# Patient Record
Sex: MALE | Race: WHITE | Employment: UNEMPLOYED | URBAN - METROPOLITAN AREA
[De-identification: names, ages, dates, MRNs, and addresses within clinical notes are randomized per-mention and may not be internally consistent; named-entity substitution may affect disease eponyms.]

---

## 2017-01-12 ENCOUNTER — OFFICE VISIT (OUTPATIENT)
Dept: FAMILY MEDICINE CLINIC | Age: 32
End: 2017-01-12

## 2017-01-12 VITALS
WEIGHT: 165.6 LBS | TEMPERATURE: 97.6 F | SYSTOLIC BLOOD PRESSURE: 122 MMHG | RESPIRATION RATE: 18 BRPM | HEART RATE: 71 BPM | HEIGHT: 72 IN | DIASTOLIC BLOOD PRESSURE: 84 MMHG | OXYGEN SATURATION: 98 % | BODY MASS INDEX: 22.43 KG/M2

## 2017-01-12 DIAGNOSIS — R10.12 ABDOMINAL PAIN, LUQ (LEFT UPPER QUADRANT): ICD-10-CM

## 2017-01-12 DIAGNOSIS — S59.912A INJURY OF LEFT LOWER ARM, INITIAL ENCOUNTER: Primary | ICD-10-CM

## 2017-01-12 DIAGNOSIS — F10.10 ALCOHOL ABUSE: ICD-10-CM

## 2017-01-12 NOTE — MR AVS SNAPSHOT
Visit Information Date & Time Provider Department Dept. Phone Encounter #  
 1/12/2017  9:30 AM Gelacio Bowie  [facility] [address] [phone] [id number] Upcoming Health Maintenance Date Due Pneumococcal 19-64 Medium Risk (1 of 1 - PPSV23) 5/25/2020* DTaP/Tdap/Td series (2 - Td) 4/7/2021 *Topic was postponed. The date shown is not the original due date. Allergies as of 1/12/2017  Review Complete On: 1/12/2017 By: Gelacio Bowie NP Severity Noted Reaction Type Reactions Pcn [Penicillins]  02/29/2012    Hives Current Immunizations  Never Reviewed No immunizations on file. Not reviewed this visit You Were Diagnosed With   
  
 Codes Comments Injury of left lower arm, initial encounter    -  Primary ICD-10-CM: U27.304C ICD-9-CM: 889. 3 Abdominal pain, LUQ (left upper quadrant)     ICD-10-CM: R10.12 ICD-9-CM: 789.02 Alcohol abuse     ICD-10-CM: F10.10 ICD-9-CM: 305.00 Vitals BP Pulse Temp Resp Height(growth percentile) Weight(growth percentile) 122/84 (BP 1 Location: Right arm, BP Patient Position: Sitting) 71 97.6 °F (36.4 °C) (Oral) 18 6' (1.829 m) 165 lb 9.6 oz (75.1 kg) SpO2 BMI Smoking Status 98% 22.46 kg/m2 Former Smoker Vitals History BMI and BSA Data Body Mass Index Body Surface Area  
 22.46 kg/m 2 1.95 m 2 Preferred Pharmacy Pharmacy Name Phone [address], [address] [facility] [phone] Your Updated Medication List  
  
   
This list is accurate as of: 1/12/17 10:21 AM.  Always use your most recent med list.  
  
  
  
  
 Dexlansoprazole 60 mg Cpdb Commonly known as:  DEXILANT Take 1 Cap by mouth daily. We Performed the Following AMYLASE [id number] CPT(R)] CBC W/O DIFF [67104 CPT(R)] HEPATIC FUNCTION PANEL [14195 CPT(R)] LIPASE [id number] CPT(R)] METABOLIC PANEL, BASIC [87070 CPT(R)] ID COLLECTION VENOUS BLOOD,VENIPUNCTURE [id number] CPT(R)] ID HANDLG&/OR CONVEY OF SPEC FOR TR OFFICE TO LAB [30035 CPT(R)] To-Do List   
 01/12/2017 Imaging:  XR FOREARM LT AP/LAT Introducing [facility]! [facility] introduces [employer] patient portal. Now you can access parts of your medical record, email your doctor's office, and request medication refills online. 1. In your internet browser, go to [email or web address] 2. Click on the First Time User? Click Here link in the Sign In box. You will see the New Member Sign Up page. 3. Enter your [employer] Access Code exactly as it appears below. You will not need to use this code after youve completed the sign-up process. If you do not sign up before the expiration date, you must request a new code. · [employer] Access Code: [email or web address] Expires: 1/15/2017  1:29 PM 
 
4. Enter the last four digits of your Social Security Number (xxxx) and Date of Birth (mm/dd/yyyy) as indicated and click Submit. You will be taken to the next sign-up page. 5. Create a [employer] ID. This will be your [employer] login ID and cannot be changed, so think of one that is secure and easy to remember. 6. Create a [employer] password. You can change your password at any time. 7. Enter your Password Reset Question and Answer. This can be used at a later time if you forget your password. 8. Enter your e-mail address. You will receive e-mail notification when new information is available in [address] 9. Click Sign Up. You can now view and download portions of your medical record. 10. Click the Download Summary menu link to download a portable copy of your medical information. If you have questions, please visit the Frequently Asked Questions section of the [employer] website. Remember, [employer] is NOT to be used for urgent needs. For medical emergencies, dial 911. Now available from your iPhone and Android! Please provide this summary of care documentation to your next provider. Your primary care clinician is listed as Mark Mccord. If you have any questions after today's visit, please call [phone].

## 2017-01-12 NOTE — PROGRESS NOTES
HISTORY OF PRESENT ILLNESS  Benjy Prather is a 28 y.o. male. HPI  C/o pain in left arm x 1 week. States he got arm caught in branches while using . Was swollen initially, now improved. Still painful with use. Using ETOH for medication. History of ETOH abuse. Also c/o abdominal and epigastric discomfort. Has chronic gastritis on dexilant secondary to ETOH use. Reports discomfort has progressed after binging last pm on liquor. No vomiting. No blood in stool or melena. Past Medical History   Diagnosis Date    Dystonia     Gastric ulcer      Patient Active Problem List   Diagnosis Code    Gastritis K29.70    Alcohol abuse F10.10     Current Outpatient Prescriptions   Medication Sig    Dexlansoprazole (DEXILANT) 60 mg CpDB Take 1 Cap by mouth daily. No current facility-administered medications for this visit. Social History   Substance Use Topics    Smoking status: Former Smoker    Smokeless tobacco: Current User    Alcohol use 0.0 oz/week     18 - 30 Cans of beer per week      Comment: 18-24 beers in a day. Visit Vitals    /84 (BP 1 Location: Right arm, BP Patient Position: Sitting)    Pulse 71    Temp 97.6 °F (36.4 °C) (Oral)    Resp 18    Ht 6' (1.829 m)    Wt 165 lb 9.6 oz (75.1 kg)    SpO2 98%    BMI 22.46 kg/m2     Review of Systems   Constitutional: Negative for chills, fever and malaise/fatigue. Respiratory: Negative for cough and shortness of breath. Cardiovascular: Negative for chest pain, palpitations and leg swelling. Gastrointestinal: Positive for abdominal pain, heartburn and nausea. Negative for blood in stool, constipation, diarrhea, melena and vomiting. Musculoskeletal:        Left lower arm pain. Neurological: Negative for tingling, sensory change and headaches. Psychiatric/Behavioral: Positive for substance abuse (alcohol). Negative for hallucinations and suicidal ideas. All other systems reviewed and are negative.       Physical Exam   Constitutional: No distress. Neck: Neck supple. Cardiovascular: Normal rate, regular rhythm and normal heart sounds. Pulmonary/Chest: Effort normal and breath sounds normal.   Abdominal: Soft. He exhibits no distension and no mass. There is tenderness (LUQ, lower abdomen). There is no guarding. Musculoskeletal:        Left wrist: Normal.        Left forearm: He exhibits tenderness (distal radius), bony tenderness, swelling (trace) and laceration (1' well healed laceration). He exhibits no deformity. Lymphadenopathy:     He has no cervical adenopathy. Neurological: He is alert. Skin: Skin is warm and dry. Psychiatric:   Flat affect. ASSESSMENT and PLAN  Daquan Nguyen was seen today for arm injury. Diagnoses and all orders for this visit:    Injury of left lower arm, initial encounter  -     XR FOREARM LT AP/LAT; Future  No fracture per radiology report. Recommend ice, elevation. Avoid strenuous use until sx improve. ES tylenol prn pain. Strongly advised not to medicate with alcohol. Abdominal pain, LUQ (left upper quadrant)  -     DC HANDLG&/OR CONVEY OF SPEC FOR TR OFFICE TO LAB  -     DC COLLECTION VENOUS BLOOD,VENIPUNCTURE  -     CBC W/O DIFF  -     METABOLIC PANEL, BASIC  -     HEPATIC FUNCTION PANEL  -     LIPASE  -     AMYLASE  Possible flare of gastritis or ulcer disease secondary to  ETOH abuse. Will check labs today. Continue dexilant daily. Add OTC zantac 150 mg at hs. To ED prn if sx progress. Alcohol abuse  Discussed alcohol cessation and complications of continued excessive alcohol intake, including early mortality. Strongly advised in patient treatment. States he has done that 2 x prior with no lasting results. Declined any treatment at this time. Follow up prn.

## 2017-03-20 ENCOUNTER — OFFICE VISIT (OUTPATIENT)
Dept: FAMILY MEDICINE CLINIC | Age: 32
End: 2017-03-20

## 2017-03-20 VITALS
SYSTOLIC BLOOD PRESSURE: 130 MMHG | DIASTOLIC BLOOD PRESSURE: 80 MMHG | WEIGHT: 162 LBS | HEIGHT: 72 IN | TEMPERATURE: 97.6 F | BODY MASS INDEX: 21.94 KG/M2 | RESPIRATION RATE: 18 BRPM | OXYGEN SATURATION: 97 % | HEART RATE: 76 BPM

## 2017-03-20 DIAGNOSIS — K29.20 CHRONIC ALCOHOLIC GASTRITIS WITHOUT HEMORRHAGE: Primary | ICD-10-CM

## 2017-03-20 RX ORDER — DEXLANSOPRAZOLE 60 MG/1
60 CAPSULE, DELAYED RELEASE ORAL DAILY
Qty: 30 CAP | Refills: 5 | Status: SHIPPED | OUTPATIENT
Start: 2017-03-20 | End: 2017-11-16 | Stop reason: SDUPTHER

## 2017-03-20 NOTE — PROGRESS NOTES
Chief Complaint   Patient presents with    Medication Refill     needs refill of Dexilant       Reviewed Record in preparation for visit and have obtained necessary documentation. Identified pt with two pt identifiers (Name @ )    There are no preventive care reminders to display for this patient. 1. Have you been to the ER, urgent care clinic since your last visit? Hospitalized since your last visit? Went to Urgent care 2 wks ago , was dehydrated , flu test was negative. 2. Have you seen or consulted any other health care providers outside of the [address] [place] [address] since your last visit? Include any pap smears or colon screening.  No

## 2017-03-20 NOTE — PROGRESS NOTES
HISTORY OF PRESENT ILLNESS  Juliette Ward is a 28 y.o. male. HPI  Follow up chronic gastritis secondary to alcohol use on daily dexilant. Was having some LUQ discomfort at last visit but reports symptoms have resolved. 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 Patient Active Problem List   Diagnosis Code    Gastritis K29.70    Alcohol use disorder ([address] F10.99     Current Outpatient Prescriptions   Medication Sig    Dexlansoprazole (DEXILANT) 60 mg CpDB Take 1 Cap by mouth daily. No current facility-administered medications for this visit. Social History   Substance Use Topics    Smoking status: Current Some Day Smoker    Smokeless tobacco: Current User      Comment: and chews tobacc0    Alcohol use 0.0 oz/week     18 - 30 Cans of beer per week      Comment: 18-24 beers in a day. Visit Vitals    /80 (BP 1 Location: Right arm, BP Patient Position: Sitting)    Pulse 76    Temp 97.6 °F (36.4 °C) (Oral)    Resp 18    Ht 6' (1.829 m)    Wt 162 lb (73.5 kg)    SpO2 97%    BMI 21.97 kg/m2       Review of Systems   Constitutional: Negative. Respiratory: Negative for cough and shortness of breath. Cardiovascular: Negative for chest pain and palpitations. Gastrointestinal: Negative for abdominal pain, blood in stool, melena, nausea and vomiting. Psychiatric/Behavioral: Positive for substance abuse. All other systems reviewed and are negative. Physical Exam   Constitutional: No distress. Thin. Neck: Neck supple. Cardiovascular: Normal rate, regular rhythm and normal heart sounds. Pulmonary/Chest: Effort normal and breath sounds normal.   Abdominal: Soft. He exhibits no distension. There is no tenderness. There is no guarding. Musculoskeletal: He exhibits no edema. Lymphadenopathy:     He has no cervical adenopathy. Skin: Skin is warm and dry.    Psychiatric: He has a normal mood and affect. His behavior is normal. Thought content normal.       ASSESSMENT and PLAN  [facility] was seen today for medication refill. Diagnoses and all orders for this visit:    Chronic alcoholic gastritis without hemorrhage  -     Dexlansoprazole (DEXILANT) 60 mg CpDB; Take 1 Cap by mouth daily. Symptoms stable on dexilant. Continue current treatment. Alcohol use disorder ([facility]    Potential complications of alcohol abuse again reviewed with patient. He is not ready to stop at this time. Follow up 6 months or sooner prn.

## 2017-03-20 NOTE — MR AVS SNAPSHOT
Visit Information Date & Time Provider Department Dept. Phone Encounter #  
 3/20/2017  3:00 PM Raulmor Charleejeane, [address] [phone] [id number] Upcoming Health Maintenance Date Due Pneumococcal 19-64 Medium Risk (1 of 1 - PPSV23) 5/25/2020* DTaP/Tdap/Td series (2 - Td) 4/7/2021 *Topic was postponed. The date shown is not the original due date. Allergies as of 3/20/2017  Review Complete On: 3/20/2017 By: Candida Akhtar NP Severity Noted Reaction Type Reactions Pcn [Penicillins]  02/29/2012    Hives Current Immunizations  Never Reviewed No immunizations on file. Not reviewed this visit You Were Diagnosed With   
  
 Codes Comments Chronic alcoholic gastritis without hemorrhage    -  Primary ICD-10-CM: K29.20 ICD-9-CM: 535.30 [facility] disorder ([facility]     ICD-10-CM: F10.99 ICD-9-CM: 305.00 Vitals BP Pulse Temp Resp Height(growth percentile) Weight(growth percentile) 130/80 (BP 1 Location: Right arm, BP Patient Position: Sitting) 76 97.6 °F (36.4 °C) (Oral) 18 6' (1.829 m) 162 lb (73.5 kg) SpO2 BMI Smoking Status 97% 21.97 kg/m2 Current Some Day Smoker BMI and BSA Data Body Mass Index Body Surface Area  
 21.97 kg/m 2 1.93 m 2 Preferred Pharmacy Pharmacy Name Phone [address], [address] [facility] [phone] Your Updated Medication List  
  
   
This list is accurate as of: 3/20/17  3:21 PM.  Always use your most recent med list.  
  
  
  
  
 Dexlansoprazole 60 mg Cpdb Commonly known as:  DEXILANT Take 1 Cap by mouth daily. Prescriptions Sent to Pharmacy Refills Dexlansoprazole (DEXILANT) 60 mg CpDB 5 Sig: Take 1 Cap by mouth daily. Class: Normal  
 Pharmacy: [address], [address] [facility] #: [phone] Route: Oral  
  
Introducing [facility]! Sarah Enriquez introduces [employer] patient portal. Now you can access parts of your medical record, email your doctor's office, and request medication refills online. 1. In your internet browser, go to [email or web address] 2. Click on the First Time User? Click Here link in the Sign In box. You will see the New Member Sign Up page. 3. Enter your [employer] Access Code exactly as it appears below. You will not need to use this code after youve completed the sign-up process. If you do not sign up before the expiration date, you must request a new code. · [employer] Access Code: [email or web address] Expires: 6/18/2017  3:21 PM 
 
4. Enter the last four digits of your Social Security Number (xxxx) and Date of Birth (mm/dd/yyyy) as indicated and click Submit. You will be taken to the next sign-up page. 5. Create a [employer] ID. This will be your [employer] login ID and cannot be changed, so think of one that is secure and easy to remember. 6. Create a [employer] password. You can change your password at any time. 7. Enter your Password Reset Question and Answer. This can be used at a later time if you forget your password. 8. Enter your e-mail address. You will receive e-mail notification when new information is available in [address] 9. Click Sign Up. You can now view and download portions of your medical record. 10. Click the Download Summary menu link to download a portable copy of your medical information. If you have questions, please visit the Frequently Asked Questions section of the [employer] website. Remember, [employer] is NOT to be used for urgent needs. For medical emergencies, dial 911. Now available from your iPhone and Android! Please provide this summary of care documentation to your next provider. Your primary care clinician is listed as Allegra Manzo. If you have any questions after today's visit, please call [phone].

## 2017-07-17 ENCOUNTER — OFFICE VISIT (OUTPATIENT)
Dept: FAMILY MEDICINE CLINIC | Age: 32
End: 2017-07-17

## 2017-07-17 VITALS
WEIGHT: 161.8 LBS | SYSTOLIC BLOOD PRESSURE: 131 MMHG | OXYGEN SATURATION: 98 % | BODY MASS INDEX: 21.44 KG/M2 | DIASTOLIC BLOOD PRESSURE: 85 MMHG | HEIGHT: 73 IN | TEMPERATURE: 98 F | HEART RATE: 70 BPM | RESPIRATION RATE: 18 BRPM

## 2017-07-17 DIAGNOSIS — S00.03XA CONTUSION OF SCALP, INITIAL ENCOUNTER: Primary | ICD-10-CM

## 2017-07-17 NOTE — PROGRESS NOTES
HISTORY OF PRESENT ILLNESS  Ab Crespo is a 28 y.o. male. HPI  C/o injury to head. Reports he was drunk and fell, striking his [place] 1 week ago. States witnesses say he did not lose consciousness but he was too drunk to remember. Still having soreness at area of injury. No headache or focal neuro changes. Denies problems with memory or speech. Patient is an alcoholic and continues to drink large quantities of alcohol. He is not ready for rehab at this time. Patient Active Problem List   Diagnosis Code    Gastritis K29.70    Alcohol use disorder ([facility] F10.99     Current Outpatient Prescriptions   Medication Sig    Dexlansoprazole (DEXILANT) 60 mg CpDB Take 1 Cap by mouth daily. No current facility-administered medications for this visit. Social History   Substance Use Topics    Smoking status: Current Some Day Smoker    Smokeless tobacco: Current User      Comment: and chews tobacc0    Alcohol use 0.0 oz/week     18 - 30 Cans of beer per week      Comment: 18-24 beers in a day. Visit Vitals    /85    Pulse 70    Temp 98 °F (36.7 °C) (Oral)    Resp 18    Ht 6' 1\" (1.854 m)    Wt 161 lb 12.8 oz (73.4 kg)    SpO2 98%    BMI 21.35 kg/m2     Review of Systems   Constitutional: Negative for chills, fever and malaise/fatigue. Eyes: Negative. Respiratory: Negative for shortness of breath. Cardiovascular: Negative for chest pain and palpitations. Neurological: Negative for dizziness, tingling, sensory change, speech change, focal weakness, seizures, loss of consciousness and headaches. Psychiatric/Behavioral: Negative for memory loss. Physical Exam   Constitutional: He is oriented to person, place, and time. No distress. HENT:   Head: Head is with contusion (right skull above temple). Head is without abrasion and without laceration. Small amount of local edema at area of contusion.    Eyes: EOM are normal. Pupils are equal, round, and reactive to light. Neck: Neck supple. Cardiovascular: Normal rate, regular rhythm and normal heart sounds. Pulmonary/Chest: Effort normal and breath sounds normal.   Musculoskeletal: He exhibits no edema. Lymphadenopathy:     He has no cervical adenopathy. Neurological: He is alert and oriented to person, place, and time. He has normal strength. No sensory deficit. Coordination and gait normal.   Skin: Skin is warm and dry. Psychiatric: He has a normal mood and affect. ASSESSMENT and PLAN  Prince Ricci was seen today for head injury. Diagnoses and all orders for this visit:    Contusion of scalp, initial encounter  -     XR SKULL MIN 4 V(COMP); Future  Normal neuro exam.  No signs of concussion but difficult to assess due to alcohol abuse. Xray negative for skull fracture. Recommend ice to affected area tid. Alcohol use disorder ([facility]    Strongly advised rehab. Patient continues to decline treatment at this time. Follow up prn.

## 2017-07-17 NOTE — PROGRESS NOTES
Identified pt with two pt identifiers(name and ). Reviewed record in preparation for visit and have obtained necessary documentation. Chief Complaint   Patient presents with    Head Injury     pt c/o hit head last week while drinking; unclear of LOC, denies visual changes        There are no preventive care reminders to display for this patient. Coordination of Care Questionnaire:  :   1) Have you been to an emergency room, urgent care clinic since your last visit? no   Hospitalized since your last visit? no             2. Have seen or consulted any other health care provider since your last visit? NO  If yes, where when, and reason for visit? 3) Do you have an Advanced Directive/ Living Will in place? NO  If yes, do we have a copy on file NO  If no, would you like information NO    Patient is accompanied by self I have received verbal consent from Kim Santos to discuss any/all medical information while they are present in the room.

## 2017-07-27 ENCOUNTER — TELEPHONE (OUTPATIENT)
Dept: FAMILY MEDICINE CLINIC | Age: 32
End: 2017-07-27

## 2017-07-27 NOTE — TELEPHONE ENCOUNTER
----- Message from Rupert Sarabia sent at 7/26/2017  5:36 PM EDT -----  Regarding: ANP [place]on / telephone  Pt is requesting To have the the Nurse call him back and best contact number is [phone].

## 2017-08-21 ENCOUNTER — APPOINTMENT (OUTPATIENT)
Dept: GENERAL RADIOLOGY | Age: 32
End: 2017-08-21
Attending: EMERGENCY MEDICINE
Payer: COMMERCIAL

## 2017-08-21 ENCOUNTER — HOSPITAL ENCOUNTER (EMERGENCY)
Age: 32
Discharge: HOME | End: 2017-08-22
Attending: EMERGENCY MEDICINE | Admitting: EMERGENCY MEDICINE
Payer: COMMERCIAL

## 2017-08-21 DIAGNOSIS — S22.31XA CLOSED FRACTURE OF RIB OF RIGHT SIDE, INITIAL ENCOUNTER: Primary | ICD-10-CM

## 2017-08-21 PROCEDURE — 99284 EMERGENCY DEPT VISIT MOD MDM: CPT

## 2017-08-21 PROCEDURE — 71101 X-RAY EXAM UNILAT RIBS/CHEST: CPT

## 2017-08-21 PROCEDURE — 94762 N-INVAS EAR/PLS OXIMTRY CONT: CPT

## 2017-08-21 PROCEDURE — 96361 HYDRATE IV INFUSION ADD-ON: CPT

## 2017-08-21 PROCEDURE — 96360 HYDRATION IV INFUSION INIT: CPT

## 2017-08-22 ENCOUNTER — APPOINTMENT (OUTPATIENT)
Dept: CT IMAGING | Age: 32
End: 2017-08-22
Attending: EMERGENCY MEDICINE
Payer: COMMERCIAL

## 2017-08-22 VITALS
DIASTOLIC BLOOD PRESSURE: 85 MMHG | BODY MASS INDEX: 21.15 KG/M2 | RESPIRATION RATE: 18 BRPM | TEMPERATURE: 97.9 F | HEART RATE: 93 BPM | OXYGEN SATURATION: 94 % | SYSTOLIC BLOOD PRESSURE: 136 MMHG | HEIGHT: 73 IN | WEIGHT: 159.6 LBS

## 2017-08-22 LAB
ALBUMIN SERPL-MCNC: 4.4 G/DL (ref 3.5–5)
ALBUMIN/GLOB SERPL: 1.2 {RATIO} (ref 1.1–2.2)
ALP SERPL-CCNC: 118 U/L (ref 45–117)
ALT SERPL-CCNC: 57 U/L (ref 12–78)
AMPHET UR QL SCN: NEGATIVE
AMYLASE SERPL-CCNC: 53 U/L (ref 25–115)
ANION GAP BLD CALC-SCNC: 19 MMOL/L (ref 5–15)
APPEARANCE UR: CLEAR
AST SERPL-CCNC: 126 U/L (ref 15–37)
BACTERIA URNS QL MICRO: NEGATIVE /HPF
BARBITURATES UR QL SCN: NEGATIVE
BENZODIAZ UR QL: NEGATIVE
BILIRUB DIRECT SERPL-MCNC: 0.3 MG/DL (ref 0–0.2)
BILIRUB SERPL-MCNC: 1.4 MG/DL (ref 0.2–1)
BILIRUB UR QL: NEGATIVE
BUN BLD-MCNC: 5 MG/DL (ref 9–20)
CA-I BLD-MCNC: 1.06 MMOL/L (ref 1.12–1.32)
CANNABINOIDS UR QL SCN: NEGATIVE
CHLORIDE BLD-SCNC: 105 MMOL/L (ref 98–107)
CO2 BLD-SCNC: 26 MMOL/L (ref 21–32)
COCAINE UR QL SCN: NEGATIVE
COLOR UR: NORMAL
CREAT BLD-MCNC: 1.1 MG/DL (ref 0.6–1.3)
DRUG SCRN COMMENT,DRGCM: NORMAL
EPITH CASTS URNS QL MICRO: NORMAL /LPF
ETHANOL SERPL-MCNC: 363 MG/DL
GLOBULIN SER CALC-MCNC: 3.8 G/DL (ref 2–4)
GLUCOSE BLD-MCNC: 100 MG/DL (ref 65–100)
GLUCOSE UR STRIP.AUTO-MCNC: NEGATIVE MG/DL
HCT VFR BLD CALC: 51 % (ref 36.6–50.3)
HGB BLD-MCNC: 17.3 GM/DL (ref 12.1–17)
HGB UR QL STRIP: NEGATIVE
HYALINE CASTS URNS QL MICRO: NORMAL /LPF (ref 0–5)
KETONES UR QL STRIP.AUTO: NEGATIVE MG/DL
LEUKOCYTE ESTERASE UR QL STRIP.AUTO: NEGATIVE
LIPASE SERPL-CCNC: 197 U/L (ref 73–393)
METHADONE UR QL: NEGATIVE
NITRITE UR QL STRIP.AUTO: NEGATIVE
OPIATES UR QL: NEGATIVE
PCP UR QL: NEGATIVE
PH UR STRIP: 5.5 [PH] (ref 5–8)
POTASSIUM BLD-SCNC: 3.9 MMOL/L (ref 3.5–5.1)
PROT SERPL-MCNC: 8.2 G/DL (ref 6.4–8.2)
PROT UR STRIP-MCNC: NEGATIVE MG/DL
RBC #/AREA URNS HPF: NORMAL /HPF (ref 0–5)
SERVICE CMNT-IMP: ABNORMAL
SODIUM BLD-SCNC: 144 MMOL/L (ref 136–145)
SP GR UR REFRACTOMETRY: <1.005 (ref 1–1.03)
UA: UC IF INDICATED,UAUC: NORMAL
UROBILINOGEN UR QL STRIP.AUTO: 0.2 EU/DL (ref 0.2–1)
WBC URNS QL MICRO: NORMAL /HPF (ref 0–4)

## 2017-08-22 PROCEDURE — 83690 ASSAY OF LIPASE: CPT | Performed by: EMERGENCY MEDICINE

## 2017-08-22 PROCEDURE — 80307 DRUG TEST PRSMV CHEM ANLYZR: CPT | Performed by: EMERGENCY MEDICINE

## 2017-08-22 PROCEDURE — 74011250636 HC RX REV CODE- 250/636: Performed by: EMERGENCY MEDICINE

## 2017-08-22 PROCEDURE — 74011000258 HC RX REV CODE- 258: Performed by: EMERGENCY MEDICINE

## 2017-08-22 PROCEDURE — 80047 BASIC METABLC PNL IONIZED CA: CPT

## 2017-08-22 PROCEDURE — 71260 CT THORAX DX C+: CPT

## 2017-08-22 PROCEDURE — 74177 CT ABD & PELVIS W/CONTRAST: CPT

## 2017-08-22 PROCEDURE — 82150 ASSAY OF AMYLASE: CPT | Performed by: EMERGENCY MEDICINE

## 2017-08-22 PROCEDURE — 80076 HEPATIC FUNCTION PANEL: CPT | Performed by: EMERGENCY MEDICINE

## 2017-08-22 PROCEDURE — 36415 COLL VENOUS BLD VENIPUNCTURE: CPT | Performed by: EMERGENCY MEDICINE

## 2017-08-22 PROCEDURE — 81001 URINALYSIS AUTO W/SCOPE: CPT | Performed by: EMERGENCY MEDICINE

## 2017-08-22 PROCEDURE — 74011636320 HC RX REV CODE- 636/320: Performed by: EMERGENCY MEDICINE

## 2017-08-22 RX ORDER — SODIUM CHLORIDE 0.9 % (FLUSH) 0.9 %
10 SYRINGE (ML) INJECTION
Status: COMPLETED | OUTPATIENT
Start: 2017-08-22 | End: 2017-08-22

## 2017-08-22 RX ORDER — NAPROXEN 500 MG/1
500 TABLET ORAL 2 TIMES DAILY WITH MEALS
Qty: 30 TAB | Refills: 0 | Status: SHIPPED | OUTPATIENT
Start: 2017-08-22 | End: 2017-09-01

## 2017-08-22 RX ORDER — HYDROCODONE BITARTRATE AND ACETAMINOPHEN 5; 325 MG/1; MG/1
1-2 TABLET ORAL
Qty: 20 TAB | Refills: 0 | Status: SHIPPED | OUTPATIENT
Start: 2017-08-22 | End: 2017-09-13

## 2017-08-22 RX ADMIN — Medication 10 ML: at 00:43

## 2017-08-22 RX ADMIN — SODIUM CHLORIDE 1000 ML: 900 INJECTION, SOLUTION INTRAVENOUS at 00:20

## 2017-08-22 RX ADMIN — SODIUM CHLORIDE 100 ML: 900 INJECTION, SOLUTION INTRAVENOUS at 00:43

## 2017-08-22 RX ADMIN — IOPAMIDOL 100 ML: 755 INJECTION, SOLUTION INTRAVENOUS at 00:43

## 2017-08-22 NOTE — ED TRIAGE NOTES
TRIAGE NOTE: Patient arrived from home with c/o RIGHT sided rib pain. Patient states, \"i was walking in socks on the hardwood floor and then I slipped and hit the couch. I feel like I broke my ribs, cause I have done it before. \" \"It hurts to take a deep breath. \"

## 2017-08-22 NOTE — ED PROVIDER NOTES
HPI Comments: 28 y.o. male with past medical history significant for dystonia, gastric ulcer who presents from home accompanied by friend with chief complaint of back pain. Pt reports s/p an evening of drinking he sustained a GLF this morning at 0100. Pt states that he fell to his right side after getting out of bed. Pt c/o right mid back/right flank pain of which is exacerbated with movement and deep inhalation. Pt states that he went back to sleep after the fall and also attended work today but that the pain has been constant. Pt reports that he does consume alcohol daily but denies a need for resources. Pt further denies abdominal pain, nausea, vomiting, diarrhea, constipation, numbness, weakness, headache, LOC, blurred vision or neck pain. There are no other acute medical concerns at this time. Social hx: + EtOH abuse. Lives with mother. PCP: Shelley Kay NP    Note written by Oracio Rocha, as dictated by Ray Ortega MD 12:05 AM    The history is provided by the patient. No  was used. Past Medical History:   Diagnosis Date    Dystonia     Gastric ulcer        Past Surgical History:   Procedure Laterality Date    HX ORTHOPAEDIC      rib fracture- LEFT and RIGHT         Family History:   Problem Relation Age of Onset    Diabetes Mother     Alcohol abuse Mother     Diabetes Maternal Grandfather     Hypertension Father     Alcohol abuse Father     Alcohol abuse Brother        Social History     Social History    Marital status: SINGLE     Spouse name: N/A    Number of children: N/A    Years of education: N/A     Occupational History    Not on file. Social History Main Topics    Smoking status: Current Some Day Smoker    Smokeless tobacco: Current User      Comment: and chews tobacc0    Alcohol use 0.0 oz/week     18 - 30 Cans of beer per week      Comment: 18-24 beers in a day.      Drug use: No    Sexual activity: Yes     Partners: Female Other Topics Concern    Not on file     Social History Narrative         ALLERGIES: Pcn [penicillins]    Review of Systems   Constitutional: Negative for fever. HENT: Negative for congestion and sore throat. Eyes: Negative for photophobia. Respiratory: Negative for cough and shortness of breath. Cardiovascular: Negative for chest pain and leg swelling. Gastrointestinal: Negative for abdominal pain, constipation, diarrhea, nausea and vomiting. Genitourinary: Negative for difficulty urinating, dysuria and hematuria. Musculoskeletal: Positive for back pain (right, mid). Negative for neck pain. Skin: Negative for rash. Neurological: Negative for dizziness, weakness, numbness and headaches. All other systems reviewed and are negative. Vitals:    08/21/17 2341   BP: (!) 147/95   Pulse: (!) 106   Resp: 16   Temp: 97.6 °F (36.4 °C)   SpO2: 95%   Weight: 72.4 kg (159 lb 9.6 oz)   Height: 6' 1\" (1.854 m)            Physical Exam   Nursing note and vitals reviewed. CONSTITUTIONAL: Well-appearing; well-nourished; in mild distress  HEAD: Normocephalic; atraumatic  EYES: PERRL; EOM intact; conjunctiva and sclera are clear bilaterally. ENT: No rhinorrhea; normal pharynx with no tonsillar hypertrophy; mucous membranes pink/moist, no erythema, no exudate. NECK: Supple; non-tender; no cervical lymphadenopathy  CARD: Normal S1, S2; no murmurs, rubs, or gallops. Regular rate and rhythm. RESP: Normal respiratory effort; breath sounds clear and equal bilaterally; no wheezes, rhonchi, or rales. Right sided chest wall tenderness on palpations and movements of torso and both arms. ABD: Normal bowel sounds; non-distended; non-tender; no palpable organomegaly, no masses, no bruits. Back Exam: Normal inspection; no vertebral point tenderness, Right CVA tenderness. Normal range of motion.   EXT: Normal ROM in all four extremities; non-tender to palpation; no swelling or deformity; distal pulses are normal, no edema. SKIN: Warm; dry; no rash. NEURO:Alert and oriented x 3, coherent, FILIBERTO-XII grossly intact, sensory and motor are non-focal.        MDM  Number of Diagnoses or Management Options  Alcoholism /alcohol abuse [facility]):   Closed fracture of rib of right side, initial encounter:   Diagnosis management comments: Assessment: 27-year-old male with following diagnosis alcohol abuse with dependency and chronic alcoholism/ right flank pain, status post fall rule out ribs fracture, abdominal, and Visceral thorac abdominal pathology. The patient denies any suicidal / homicidal ideation      Plan: EKG/ lab/ IV fluid/ chest x-ray with right-sided weakne/ CT chest, abdomen, and pelvis/ Monitor and Reevaluate. Amount and/or Complexity of Data Reviewed  Clinical lab tests: ordered and reviewed  Tests in the radiology section of CPT®: ordered and reviewed  Tests in the medicine section of CPT®: ordered and reviewed  Discussion of test results with the performing providers: yes  Decide to obtain previous medical records or to obtain history from someone other than the patient: yes  Obtain history from someone other than the patient: yes  Review and summarize past medical records: yes  Discuss the patient with other providers: yes  Independent visualization of images, tracings, or specimens: yes    Risk of Complications, Morbidity, and/or Mortality  Presenting problems: moderate  Diagnostic procedures: moderate  Management options: moderate      ED Course       Procedures     XRAY INTERPRETATION (ED MD)  Chest Xray and Right sided Rib series:  Acute nondisplaced lateral right seventh rib fracture. Clear lungs. No infiltrate. Albertine Gitelman, MD 12:28 AM    Progress Note:   Pt has been reexamined by Albertine Gitelman, MD. Pt is feeling much better. Symptoms have improved. All available results have been reviewed with pt and any available family. Pt understands sx, dx, and tx in ED.  Care plan has been outlined and questions have been answered. Pt is ready to go home. Will send home on alcoholism/ fracture instructions. Prescription of naproxen and norc. Outpatient referral with PCP as needed. Written by Steph Colbert MD,4:47 AM    .   .

## 2017-08-22 NOTE — DISCHARGE INSTRUCTIONS
We hope that we have addressed all of your medical concerns. The examination and treatment you received in the Emergency Department were for an emergent problem and were not intended as complete care. It is important that you follow up with your healthcare provider(s) for ongoing care. If your symptoms worsen or do not improve as expected, and you are unable to reach your usual health care provider(s), you should return to the Emergency Department. Today's healthcare is undergoing tremendous change, and patient satisfaction surveys are one of the many tools to assess the quality of medical care. You may receive a survey from the [employer] regarding your experience in the Emergency Department. I hope that your experience has been completely positive, particularly the medical care that I provided. As such, please participate in the survey; anything less than excellent does not meet my expectations or intentions. [facility] and [address] [place] [address] participate in nationally recognized quality of care measures. If your blood pressure is greater than 120/80, as reported below, we urge that you seek medical care to address the potential of high blood pressure, commonly known as hypertension. Hypertension can be hereditary or can be caused by certain medical conditions, pain, stress, or \"white coat syndrome. \"       Please make an appointment with your health care provider(s) for follow up of your Emergency Department visit. VITALS:   Patient Vitals for the past 8 hrs:   Temp Pulse Resp BP SpO2   08/21/17 2341 97.6 °F (36.4 °C) (!) 106 16 (!) 147/95 95 %          Thank you for allowing us to provide you with medical care today. We realize that you have many choices for your emergency care needs. Please choose us in the future for any continued health care needs. Feliciano Arauz MD    [facility]. Office: [phone]            Recent Results (from the past 24 hour(s))   POC CHEM8    Collection Time: 08/22/17 12:19 AM   Result Value Ref Range    Calcium, ionized (POC) 1.06 (L) 1.12 - 1.32 MMOL/L    Sodium (POC) 144 136 - 145 MMOL/L    Potassium (POC) 3.9 3.5 - 5.1 MMOL/L    Chloride (POC) 105 98 - 107 MMOL/L    CO2 (POC) 26 21 - 32 MMOL/L    Anion gap (POC) 19 (H) 5 - 15 mmol/L    Glucose (POC) 100 65 - 100 MG/DL    BUN (POC) 5 (L) 9 - 20 MG/DL    Creatinine (POC) 1.1 0.6 - 1.3 MG/DL    GFRAA, POC >60 >60 ml/min/1.73m2    GFRNA, POC >60 >60 ml/min/1.73m2    Hemoglobin (POC) 17.3 (H) 12.1 - 17.0 GM/DL    Hematocrit (POC) 51 (H) 36.6 - 50.3 %    Comment Comment Not Indicated.      ETHYL ALCOHOL    Collection Time: 08/22/17 12:21 AM   Result Value Ref Range    ALCOHOL(ETHYL),SERUM 363 (H) <10 MG/DL   AMYLASE    Collection Time: 08/22/17 12:21 AM   Result Value Ref Range    Amylase 53 25 - 115 U/L   LIPASE    Collection Time: 08/22/17 12:21 AM   Result Value Ref Range    Lipase 197 73 - 393 U/L   URINALYSIS W/ REFLEX CULTURE    Collection Time: 08/22/17 12:21 AM   Result Value Ref Range    Color YELLOW/STRAW      Appearance CLEAR CLEAR      Specific gravity <1.005 1.003 - 1.030    pH (UA) 5.5 5.0 - 8.0      Protein NEGATIVE  NEG mg/dL    Glucose NEGATIVE  NEG mg/dL    Ketone NEGATIVE  NEG mg/dL    Bilirubin NEGATIVE  NEG      Blood NEGATIVE  NEG      Urobilinogen 0.2 0.2 - 1.0 EU/dL    Nitrites NEGATIVE  NEG      Leukocyte Esterase NEGATIVE  NEG      WBC 0-4 0 - 4 /hpf    RBC 0-5 0 - 5 /hpf    Epithelial cells FEW FEW /lpf    Bacteria NEGATIVE  NEG /hpf    UA:UC IF INDICATED CULTURE NOT INDICATED BY UA RESULT CNI      Hyaline cast 0-2 0 - 5 /lpf   DRUG SCREEN, URINE    Collection Time: 08/22/17 12:21 AM   Result Value Ref Range    AMPHETAMINES NEGATIVE  NEG      BARBITURATES NEGATIVE  NEG      BENZODIAZEPINE NEGATIVE  NEG      COCAINE NEGATIVE  NEG      METHADONE NEGATIVE  NEG      OPIATES NEGATIVE  NEG PCP(PHENCYCLIDINE) NEGATIVE  NEG      THC (TH-CANNABINOL) NEGATIVE  NEG      Drug screen comment (NOTE)    HEPATIC FUNCTION PANEL    Collection Time: 08/22/17 12:21 AM   Result Value Ref Range    Protein, total 8.2 6.4 - 8.2 g/dL    Albumin 4.4 3.5 - 5.0 g/dL    Globulin 3.8 2.0 - 4.0 g/dL    A-G Ratio 1.2 1.1 - 2.2      Bilirubin, total 1.4 (H) 0.2 - 1.0 MG/DL    Bilirubin, direct 0.3 (H) 0.0 - 0.2 MG/DL    Alk. phosphatase 118 (H) 45 - 117 U/L    AST (SGOT) 126 (H) 15 - 37 U/L    ALT (SGPT) 57 12 - 78 U/L       Ct Chest W Cont    Result Date: 8/22/2017  EXAM:  CT thorax with contrast; CT abdomen pelvis with contrast INDICATION: Right flank and back pain after fall. COMPARISON: Chest radiographs 8/21/2017. CT abdomen/pelvis 7/1/2010. TECHNIQUE: Helical CT of the chest, abdomen  and pelvis  with with intravenous contrast.  Coronal and sagittal reformats are performed. CT dose reduction was achieved through use of a standardized protocol tailored for this examination and automatic exposure control for dose modulation. Adaptive statistical iterative reconstruction (ASIR) was utilized. FINDINGS: CT chest:  The aorta and main pulmonary artery are normal in caliber. The heart size is normal.  There is no pericardial or pleural effusion. There are no enlarged axillary, mediastinal, or hilar lymph nodes. There is no lung mass or airspace opacity. There is no pneumothorax. The central airways are clear. There is an acute nondisplaced lateral right seventh rib fracture. CT abdomen and pelvis: The liver, spleen, pancreas, and adrenal glands are normal. The kidneys are symmetric without hydronephrosis. The gall bladder is present  without intra- or extra-hepatic biliary dilatation. There are no dilated bowel loops. The appendix is normal.  There are no enlarged lymph nodes. There is no free fluid or free air. The aorta tapers without aneurysm. The urinary bladder is normal.  There is no pelvic mass.   The prostate is not enlarged. The bony structures are age-appropriate. IMPRESSION: 1. Acute nondisplaced lateral right seventh rib fracture. 2. No other acute abnormality in the chest, abdomen, or pelvis. Ct Abd Pelv W Cont    Result Date: 8/22/2017  EXAM:  CT thorax with contrast; CT abdomen pelvis with contrast INDICATION: Right flank and back pain after fall. COMPARISON: Chest radiographs 8/21/2017. CT abdomen/pelvis 7/1/2010. TECHNIQUE: Helical CT of the chest, abdomen  and pelvis  with with intravenous contrast.  Coronal and sagittal reformats are performed. CT dose reduction was achieved through use of a standardized protocol tailored for this examination and automatic exposure control for dose modulation. Adaptive statistical iterative reconstruction (ASIR) was utilized. FINDINGS: CT chest:  The aorta and main pulmonary artery are normal in caliber. The heart size is normal.  There is no pericardial or pleural effusion. There are no enlarged axillary, mediastinal, or hilar lymph nodes. There is no lung mass or airspace opacity. There is no pneumothorax. The central airways are clear. There is an acute nondisplaced lateral right seventh rib fracture. CT abdomen and pelvis: The liver, spleen, pancreas, and adrenal glands are normal. The kidneys are symmetric without hydronephrosis. The gall bladder is present  without intra- or extra-hepatic biliary dilatation. There are no dilated bowel loops. The appendix is normal.  There are no enlarged lymph nodes. There is no free fluid or free air. The aorta tapers without aneurysm. The urinary bladder is normal.  There is no pelvic mass. The prostate is not enlarged. The bony structures are age-appropriate. IMPRESSION: 1. Acute nondisplaced lateral right seventh rib fracture. 2. No other acute abnormality in the chest, abdomen, or pelvis.      Xr Ribs Rt W Pa Cxr Min 3 V    Result Date: 8/22/2017  EXAM:  XR RIBS RT W PA CXR MIN 3 V INDICATION:  Right rib pain after injury. COMPARISON: 1/28/2010. TECHNIQUE: Frontal upright chest view and 4 views of the right ribs. FINDINGS: The cardiomediastinal contours are stable. The lungs and pleural spaces are clear. There is no pneumothorax. There is an acute nondisplaced lateral right seventh rib fracture. The bones are otherwise unremarkable. The visualized upper abdomen is unremarkable. IMPRESSION: Acute nondisplaced lateral right seventh rib fracture. Clear lungs. Alcohol and Drug Problems: Care Instructions  Your Care Instructions  You can improve your life and health by stopping your use of alcohol or drugs. Ending dependency on alcohol or drugs may help you feel and sleep better. You may get along better with your family, friends, and coworkers. There are medicines and programs that can help. Follow-up care is a key part of your treatment and safety. Be sure to make and go to all appointments, and call your doctor if you are having problems. It's also a good idea to know your test results and keep a list of the medicines you take. How can you care for yourself at home? · If you have been given medicine to help keep you sober or reduce your cravings, be sure to take it as prescribed. · Talk to your doctor about programs that can help you stop using drugs or drinking alcohol. · If your doctor prescribes disulfiram (Antabuse), do not drink any alcohol while you are taking this medicine. You may have severe, even life-threatening, side effects from even small amounts of alcohol. · Do not tempt yourself by keeping alcohol or drugs in your home. · Learn how to say no when other people drink or use drugs. Or don't spend time with people who drink or use drugs. · Use the time and money spent on drinking or drugs to do something fun with your family or friends. Preventing a relapse  · Do not drink alcohol or use drugs at all. Using any amount of alcohol or drugs greatly increases your risk for relapse.   · Seek help from organizations such as Alcoholics Anonymous, Narcotics Anonymous, or treatment facilities if you feel the need to drink alcohol or use drugs again. · Remember that recovery is a lifelong process. · Stay away from situations, friends, or places that may lead you to drink or use drugs. · Have a plan to spot and deal with relapse. Learn to recognize changes in your thinking that lead you to drink or use drugs. These are warning signs. Get help before you start to drink or use drugs again. · Get help as soon as you can if you relapse. Some people make a plan with another person that outlines what they want that person to do for them if they relapse. The plan usually includes how to handle the relapse and who to notify in case of relapse. · Don't give up. Remember that a relapse does not mean that you have failed. Use the experience to learn the triggers that lead you to drink or use drugs. Then quit again. Many people have several relapses before they are able to quit for good. When should you call for help? Call 911 anytime you think you may need emergency care. For example, call if:  · You feel you cannot stop from hurting yourself or someone else. Call your doctor now or seek immediate medical care if:  · You have serious withdrawal symptoms such as confusion, hallucinations, or severe trembling. Watch closely for changes in your health, and be sure to contact your doctor if:  · You have a relapse. · You need more help or support to stop. Where can you learn more? Go to http://darinel-nova.info/. Enter [id number] in the search box to learn more about \"Alcohol and Drug Problems: Care Instructions. \"  Current as of: November 3, 2016  Content Version: 11.3  © 5310-2105 [employer]. Care instructions adapted under license by [employer] (which disclaims liability or warranty for this information).  If you have questions about a medical condition or this instruction, always ask your healthcare professional. [place] [address] any warranty or liability for your use of this information. Broken Rib: Care Instructions  Your Care Instructions    A broken rib is a crack or break in one of the bones of the rib cage. Breathing can be very painful because the muscles used for breathing pull on the rib. In most cases, a broken rib will heal on its own. You can take pain medicine while the rib mends. Pain relief allows you to take deep breaths. In the past, doctors recommended taping or wrapping broken ribs. This is no longer done because taping makes it hard for you to take deep breaths. You need to take deep breaths at least once an hour to prevent pneumonia or a partial collapse of a lung. Your rib will heal in about 6 weeks. You heal best when you take good care of yourself. Eat a variety of healthy foods, and don't smoke. Follow-up care is a key part of your treatment and safety. Be sure to make and go to all appointments, and call your doctor if you are having problems. It's also a good idea to know your test results and keep a list of the medicines you take. How can you care for yourself at home? · Be safe with medicines. Read and follow all instructions on the label. ¨ If the doctor gave you a prescription medicine for pain, take it as prescribed. ¨ If you are not taking a prescription pain medicine, ask your doctor if you can take an over-the-counter medicine. · Even if it hurts, cough or take the deepest breath you can at least once every hour. This will get air deeply into your lungs and reduce your chance of getting pneumonia or a partial collapse of a lung. Hold a pillow against your chest to make this less painful. · Put ice or a cold pack on the area for 10 to 20 minutes at a time. Put a thin cloth between the ice and your skin. When should you call for help? Call 911 anytime you think you may need emergency care.  For example, call if:  · You have severe trouble breathing. Call your doctor now or seek immediate medical care if:  · You have some trouble breathing. · You have a fever. · You have a new or worse cough. Watch closely for changes in your health, and be sure to contact your doctor if:  · You have pain even after taking your medicine. · You do not get better as expected. Where can you learn more? Go to http://darinel-nova.info/. Enter M135 in the search box to learn more about \"Broken Rib: Care Instructions. \"  Current as of: March 21, 2017  Content Version: 11.3  © 5157-5617 [employer]. Care instructions adapted under license by [employer] (which disclaims liability or warranty for this information). If you have questions about a medical condition or this instruction, always ask your healthcare professional. [address] any warranty or liability for your use of this information.

## 2017-08-23 ENCOUNTER — TELEPHONE (OUTPATIENT)
Dept: FAMILY MEDICINE CLINIC | Age: 32
End: 2017-08-23

## 2017-08-23 NOTE — TELEPHONE ENCOUNTER
Writer returned call to patient, advised patient we did not have Naproxen on listed allergies to medication. Naproxen prescribed by another provider, advised patient to contact that provider's office in regard to the Naproxen prescription. Patient understood and agreed to understanding.

## 2017-08-23 NOTE — TELEPHONE ENCOUNTER
[facility]    -      [phone]     -   Patient stated he had a bad reaction to   Naproxen-     Requesting call from nurse -

## 2017-09-13 ENCOUNTER — HOSPITAL ENCOUNTER (INPATIENT)
Age: 32
LOS: 2 days | Discharge: HOME | DRG: 440 | End: 2017-09-15
Attending: EMERGENCY MEDICINE | Admitting: HOSPITALIST
Payer: COMMERCIAL

## 2017-09-13 ENCOUNTER — APPOINTMENT (OUTPATIENT)
Dept: CT IMAGING | Age: 32
DRG: 440 | End: 2017-09-13
Attending: EMERGENCY MEDICINE
Payer: COMMERCIAL

## 2017-09-13 DIAGNOSIS — F10.10 ALCOHOL ABUSE: ICD-10-CM

## 2017-09-13 DIAGNOSIS — K85.20 ALCOHOL-INDUCED ACUTE PANCREATITIS WITHOUT INFECTION OR NECROSIS: Primary | ICD-10-CM

## 2017-09-13 PROBLEM — R10.9 ABDOMINAL PAIN: Status: ACTIVE | Noted: 2017-09-13

## 2017-09-13 LAB
ALBUMIN SERPL-MCNC: 4 G/DL (ref 3.5–5)
ALBUMIN/GLOB SERPL: 1 {RATIO} (ref 1.1–2.2)
ALP SERPL-CCNC: 81 U/L (ref 45–117)
ALT SERPL-CCNC: 45 U/L (ref 12–78)
ANION GAP SERPL CALC-SCNC: 12 MMOL/L (ref 5–15)
AST SERPL-CCNC: 60 U/L (ref 15–37)
BASOPHILS # BLD: 0 K/UL (ref 0–0.1)
BASOPHILS NFR BLD: 0 % (ref 0–1)
BILIRUB SERPL-MCNC: 1.2 MG/DL (ref 0.2–1)
BUN SERPL-MCNC: 4 MG/DL (ref 6–20)
BUN/CREAT SERPL: 7 (ref 12–20)
CALCIUM SERPL-MCNC: 8.9 MG/DL (ref 8.5–10.1)
CHLORIDE SERPL-SCNC: 102 MMOL/L (ref 97–108)
CO2 SERPL-SCNC: 24 MMOL/L (ref 21–32)
CREAT SERPL-MCNC: 0.55 MG/DL (ref 0.7–1.3)
EOSINOPHIL # BLD: 0.1 K/UL (ref 0–0.4)
EOSINOPHIL NFR BLD: 1 % (ref 0–7)
ERYTHROCYTE [DISTWIDTH] IN BLOOD BY AUTOMATED COUNT: 12.4 % (ref 11.5–14.5)
GLOBULIN SER CALC-MCNC: 4.1 G/DL (ref 2–4)
GLUCOSE SERPL-MCNC: 107 MG/DL (ref 65–100)
HCT VFR BLD AUTO: 47.1 % (ref 36.6–50.3)
HGB BLD-MCNC: 16.7 G/DL (ref 12.1–17)
LIPASE SERPL-CCNC: 726 U/L (ref 73–393)
LYMPHOCYTES # BLD: 0.9 K/UL (ref 0.8–3.5)
LYMPHOCYTES NFR BLD: 16 % (ref 12–49)
MCH RBC QN AUTO: 32.6 PG (ref 26–34)
MCHC RBC AUTO-ENTMCNC: 35.5 G/DL (ref 30–36.5)
MCV RBC AUTO: 92 FL (ref 80–99)
MONOCYTES # BLD: 0.5 K/UL (ref 0–1)
MONOCYTES NFR BLD: 10 % (ref 5–13)
NEUTS SEG # BLD: 4.1 K/UL (ref 1.8–8)
NEUTS SEG NFR BLD: 73 % (ref 32–75)
PLATELET # BLD AUTO: 232 K/UL (ref 150–400)
POTASSIUM SERPL-SCNC: 3.5 MMOL/L (ref 3.5–5.1)
PROT SERPL-MCNC: 8.1 G/DL (ref 6.4–8.2)
RBC # BLD AUTO: 5.12 M/UL (ref 4.1–5.7)
SODIUM SERPL-SCNC: 138 MMOL/L (ref 136–145)
WBC # BLD AUTO: 5.6 K/UL (ref 4.1–11.1)

## 2017-09-13 PROCEDURE — 99218 HC RM OBSERVATION: CPT

## 2017-09-13 PROCEDURE — 36415 COLL VENOUS BLD VENIPUNCTURE: CPT | Performed by: EMERGENCY MEDICINE

## 2017-09-13 PROCEDURE — 74011000258 HC RX REV CODE- 258: Performed by: EMERGENCY MEDICINE

## 2017-09-13 PROCEDURE — 96374 THER/PROPH/DIAG INJ IV PUSH: CPT

## 2017-09-13 PROCEDURE — 83690 ASSAY OF LIPASE: CPT | Performed by: EMERGENCY MEDICINE

## 2017-09-13 PROCEDURE — 74011250636 HC RX REV CODE- 250/636: Performed by: EMERGENCY MEDICINE

## 2017-09-13 PROCEDURE — 80053 COMPREHEN METABOLIC PANEL: CPT | Performed by: EMERGENCY MEDICINE

## 2017-09-13 PROCEDURE — 96375 TX/PRO/DX INJ NEW DRUG ADDON: CPT

## 2017-09-13 PROCEDURE — 74011636320 HC RX REV CODE- 636/320: Performed by: EMERGENCY MEDICINE

## 2017-09-13 PROCEDURE — 74011250636 HC RX REV CODE- 250/636: Performed by: HOSPITALIST

## 2017-09-13 PROCEDURE — 74011000250 HC RX REV CODE- 250: Performed by: HOSPITALIST

## 2017-09-13 PROCEDURE — 65270000029 HC RM PRIVATE

## 2017-09-13 PROCEDURE — 85025 COMPLETE CBC W/AUTO DIFF WBC: CPT | Performed by: EMERGENCY MEDICINE

## 2017-09-13 PROCEDURE — 74177 CT ABD & PELVIS W/CONTRAST: CPT

## 2017-09-13 PROCEDURE — 74011000250 HC RX REV CODE- 250: Performed by: EMERGENCY MEDICINE

## 2017-09-13 PROCEDURE — 96361 HYDRATE IV INFUSION ADD-ON: CPT

## 2017-09-13 PROCEDURE — 99283 EMERGENCY DEPT VISIT LOW MDM: CPT

## 2017-09-13 PROCEDURE — 94762 N-INVAS EAR/PLS OXIMTRY CONT: CPT

## 2017-09-13 RX ORDER — MORPHINE SULFATE 2 MG/ML
2 INJECTION, SOLUTION INTRAMUSCULAR; INTRAVENOUS
Status: DISCONTINUED | OUTPATIENT
Start: 2017-09-13 | End: 2017-09-14

## 2017-09-13 RX ORDER — FENTANYL CITRATE 50 UG/ML
50 INJECTION, SOLUTION INTRAMUSCULAR; INTRAVENOUS
Status: COMPLETED | OUTPATIENT
Start: 2017-09-13 | End: 2017-09-13

## 2017-09-13 RX ORDER — SODIUM CHLORIDE 0.9 % (FLUSH) 0.9 %
5-10 SYRINGE (ML) INJECTION AS NEEDED
Status: DISCONTINUED | OUTPATIENT
Start: 2017-09-13 | End: 2017-09-15 | Stop reason: HOSPADM

## 2017-09-13 RX ORDER — MORPHINE SULFATE 4 MG/ML
2 INJECTION, SOLUTION INTRAMUSCULAR; INTRAVENOUS
Status: DISCONTINUED | OUTPATIENT
Start: 2017-09-13 | End: 2017-09-13 | Stop reason: SDUPTHER

## 2017-09-13 RX ORDER — MORPHINE SULFATE 4 MG/ML
4 INJECTION, SOLUTION INTRAMUSCULAR; INTRAVENOUS ONCE
Status: COMPLETED | OUTPATIENT
Start: 2017-09-13 | End: 2017-09-13

## 2017-09-13 RX ORDER — MAGNESIUM SULFATE 1 G/100ML
1 INJECTION INTRAVENOUS ONCE
Status: COMPLETED | OUTPATIENT
Start: 2017-09-13 | End: 2017-09-13

## 2017-09-13 RX ORDER — ONDANSETRON 2 MG/ML
4 INJECTION INTRAMUSCULAR; INTRAVENOUS
Status: DISCONTINUED | OUTPATIENT
Start: 2017-09-13 | End: 2017-09-15 | Stop reason: HOSPADM

## 2017-09-13 RX ORDER — HYDROMORPHONE HYDROCHLORIDE 1 MG/ML
1 INJECTION, SOLUTION INTRAMUSCULAR; INTRAVENOUS; SUBCUTANEOUS
Status: DISCONTINUED | OUTPATIENT
Start: 2017-09-13 | End: 2017-09-14

## 2017-09-13 RX ORDER — SODIUM CHLORIDE 9 MG/ML
125 INJECTION, SOLUTION INTRAVENOUS CONTINUOUS
Status: DISCONTINUED | OUTPATIENT
Start: 2017-09-13 | End: 2017-09-15 | Stop reason: HOSPADM

## 2017-09-13 RX ORDER — ONDANSETRON 2 MG/ML
4 INJECTION INTRAMUSCULAR; INTRAVENOUS
Status: COMPLETED | OUTPATIENT
Start: 2017-09-13 | End: 2017-09-13

## 2017-09-13 RX ORDER — ACETAMINOPHEN 325 MG/1
TABLET ORAL
COMMUNITY
End: 2019-08-26 | Stop reason: ALTCHOICE

## 2017-09-13 RX ORDER — PANTOPRAZOLE SODIUM 40 MG/1
40 TABLET, DELAYED RELEASE ORAL DAILY
Status: DISCONTINUED | OUTPATIENT
Start: 2017-09-14 | End: 2017-09-15 | Stop reason: HOSPADM

## 2017-09-13 RX ORDER — LORAZEPAM 2 MG/ML
1 INJECTION INTRAMUSCULAR
Status: DISCONTINUED | OUTPATIENT
Start: 2017-09-13 | End: 2017-09-15 | Stop reason: HOSPADM

## 2017-09-13 RX ORDER — SODIUM CHLORIDE 0.9 % (FLUSH) 0.9 %
10 SYRINGE (ML) INJECTION
Status: COMPLETED | OUTPATIENT
Start: 2017-09-13 | End: 2017-09-13

## 2017-09-13 RX ORDER — SODIUM CHLORIDE 0.9 % (FLUSH) 0.9 %
5-10 SYRINGE (ML) INJECTION EVERY 8 HOURS
Status: DISCONTINUED | OUTPATIENT
Start: 2017-09-13 | End: 2017-09-15 | Stop reason: HOSPADM

## 2017-09-13 RX ORDER — FAMOTIDINE 10 MG/ML
20 INJECTION INTRAVENOUS
Status: COMPLETED | OUTPATIENT
Start: 2017-09-13 | End: 2017-09-13

## 2017-09-13 RX ADMIN — LORAZEPAM 1 MG: 2 INJECTION INTRAMUSCULAR; INTRAVENOUS at 17:17

## 2017-09-13 RX ADMIN — FAMOTIDINE 20 MG: 10 INJECTION, SOLUTION INTRAVENOUS at 12:06

## 2017-09-13 RX ADMIN — ONDANSETRON 4 MG: 2 INJECTION INTRAMUSCULAR; INTRAVENOUS at 16:22

## 2017-09-13 RX ADMIN — IOPAMIDOL 100 ML: 755 INJECTION, SOLUTION INTRAVENOUS at 13:28

## 2017-09-13 RX ADMIN — FENTANYL CITRATE 50 MCG: 50 INJECTION, SOLUTION INTRAMUSCULAR; INTRAVENOUS at 12:06

## 2017-09-13 RX ADMIN — MORPHINE SULFATE 4 MG: 4 INJECTION, SOLUTION INTRAMUSCULAR; INTRAVENOUS at 14:33

## 2017-09-13 RX ADMIN — FOLIC ACID: 5 INJECTION, SOLUTION INTRAMUSCULAR; INTRAVENOUS; SUBCUTANEOUS at 17:18

## 2017-09-13 RX ADMIN — SODIUM CHLORIDE 125 ML/HR: 900 INJECTION, SOLUTION INTRAVENOUS at 16:17

## 2017-09-13 RX ADMIN — Medication 10 ML: at 13:28

## 2017-09-13 RX ADMIN — HYDROMORPHONE HYDROCHLORIDE 1 MG: 1 INJECTION, SOLUTION INTRAMUSCULAR; INTRAVENOUS; SUBCUTANEOUS at 21:43

## 2017-09-13 RX ADMIN — Medication 10 ML: at 17:17

## 2017-09-13 RX ADMIN — SODIUM CHLORIDE 1000 ML: 900 INJECTION, SOLUTION INTRAVENOUS at 12:06

## 2017-09-13 RX ADMIN — ONDANSETRON 4 MG: 2 INJECTION INTRAMUSCULAR; INTRAVENOUS at 12:06

## 2017-09-13 RX ADMIN — SODIUM CHLORIDE 100 ML: 900 INJECTION, SOLUTION INTRAVENOUS at 13:28

## 2017-09-13 RX ADMIN — MAGNESIUM SULFATE HEPTAHYDRATE 1 G: 1 INJECTION, SOLUTION INTRAVENOUS at 16:18

## 2017-09-13 RX ADMIN — Medication 10 ML: at 21:43

## 2017-09-13 NOTE — H&P
History & Physical    Primary Care Provider: Janine Christina NP  Source of Information: Patient     History of Presenting Illness:   Jinny Lyon is a 28 y.o. male who presents with abdominal pain since this morning dull ache in nature mostly in upper abdomen non radiating. In brief  The pt had a BM one night ago and reports that he has not had flatulence since the onset of the episode. Food and drink did not worsen pain significantly. The pt drinks 12-30 beers a day and his last drink was at 0200. This episode feels different and worse than his last gastric ulcer. pt has PMH of gastric ulcer and dystonia who presents from home with mother CT done in ER showing pancreatitis but lipase in < 1000  - The patient denies any fever, chills, chest pain, cough, congestion, recent illness, palpitations, or dysuria. Review of Systems:  A comprehensive review of systems was negative. Past Medical History:   Diagnosis Date    Dystonia     Gastric ulcer       Past Surgical History:   Procedure Laterality Date    HX ORTHOPAEDIC      rib fracture- LEFT and RIGHT     Prior to Admission medications    Medication Sig Start Date End Date Taking? Authorizing Provider   acetaminophen (TYLENOL) 325 mg tablet Take  by mouth every four (4) hours as needed for Pain. Yes Historical Provider   Dexlansoprazole (DEXILANT) 60 mg CpDB Take 1 Cap by mouth daily.  3/20/17  Yes Janine Christina NP     Allergies   Allergen Reactions    Pcn [Penicillins] Hives      Family History   Problem Relation Age of Onset    Diabetes Mother     Alcohol abuse Mother     Diabetes Maternal Grandfather     Hypertension Father     Alcohol abuse Father     Alcohol abuse Brother         SOCIAL HISTORY:  Patient resides:  Independently x   Assisted Living    SNF    With family care       Smoking history:   None x   Former    Chronic      Alcohol history:   None    Social    Chronic x     Ambulates: Independently x   w/cane    w/walker    w/wc    CODE STATUS:  DNR    Full x   Other      Objective:     Physical Exam:     Visit Vitals    /82    Pulse 71    Temp 97.5 °F (36.4 °C)    Resp 16    Ht 6' 1\" (1.854 m)    Wt 72.2 kg (159 lb 2 oz)    SpO2 96%    BMI 20.99 kg/m2      O2 Device: Room air    General:  Alert, cooperative, no distress, appears stated age. Head:  Normocephalic, without obvious abnormality, atraumatic. Eyes:  Conjunctivae/corneas clear. PERRL, EOMs intact. Nose: Nares normal. Septum midline. Mucosa normal. No drainage or sinus tenderness. Throat: Lips, mucosa, and tongue normal. Teeth and gums normal.   Neck: Supple, symmetrical, trachea midline, no adenopathy, thyroid: no enlargement/tenderness/nodules, no carotid bruit and no JVD. Back:   Symmetric, no curvature. ROM normal. No CVA tenderness. Lungs:   Clear to auscultation bilaterally. Chest wall:  No tenderness or deformity. Heart:  Regular rate and rhythm, S1, S2 normal, no murmur, click, rub or gallop. Abdomen:   Soft, ++ tender RUQ and LUQ  Bowel sounds normal. No masses,  No organomegaly. Extremities: Extremities normal, atraumatic, no cyanosis or edema. Pulses: 2+ and symmetric all extremities. Skin: Skin color, texture, turgor normal. No rashes or lesions   Neurologic: CNII-XII intact. EKG:  normal sinus rhythm. Data Review:     Recent Days:  Recent Labs      09/13/17   1159   WBC  5.6   HGB  16.7   HCT  47.1   PLT  232     Recent Labs      09/13/17   1159   NA  138   K  3.5   CL  102   CO2  24   GLU  107*   BUN  4*   CREA  0.55*   CA  8.9   ALB  4.0   TBILI  1.2*   SGOT  60*   ALT  45     No results for input(s): PH, PCO2, PO2, HCO3, FIO2 in the last 72 hours.     24 Hour Results:  Recent Results (from the past 24 hour(s))   CBC WITH AUTOMATED DIFF    Collection Time: 09/13/17 11:59 AM   Result Value Ref Range    WBC 5.6 4.1 - 11.1 K/uL    RBC 5.12 4.10 - 5.70 M/uL    HGB 16.7 12.1 - 17.0 g/dL    HCT 47.1 36.6 - 50.3 %    MCV 92.0 80.0 - 99.0 FL    MCH 32.6 26.0 - 34.0 PG    MCHC 35.5 30.0 - 36.5 g/dL    RDW 12.4 11.5 - 14.5 %    PLATELET 979 426 - 189 K/uL    NEUTROPHILS 73 32 - 75 %    LYMPHOCYTES 16 12 - 49 %    MONOCYTES 10 5 - 13 %    EOSINOPHILS 1 0 - 7 %    BASOPHILS 0 0 - 1 %    ABS. NEUTROPHILS 4.1 1.8 - 8.0 K/UL    ABS. LYMPHOCYTES 0.9 0.8 - 3.5 K/UL    ABS. MONOCYTES 0.5 0.0 - 1.0 K/UL    ABS. EOSINOPHILS 0.1 0.0 - 0.4 K/UL    ABS. BASOPHILS 0.0 0.0 - 0.1 K/UL   METABOLIC PANEL, COMPREHENSIVE    Collection Time: 09/13/17 11:59 AM   Result Value Ref Range    Sodium 138 136 - 145 mmol/L    Potassium 3.5 3.5 - 5.1 mmol/L    Chloride 102 97 - 108 mmol/L    CO2 24 21 - 32 mmol/L    Anion gap 12 5 - 15 mmol/L    Glucose 107 (H) 65 - 100 mg/dL    BUN 4 (L) 6 - 20 MG/DL    Creatinine 0.55 (L) 0.70 - 1.30 MG/DL    BUN/Creatinine ratio 7 (L) 12 - 20      GFR est AA >60 >60 ml/min/1.73m2    GFR est non-AA >60 >60 ml/min/1.73m2    Calcium 8.9 8.5 - 10.1 MG/DL    Bilirubin, total 1.2 (H) 0.2 - 1.0 MG/DL    ALT (SGPT) 45 12 - 78 U/L    AST (SGOT) 60 (H) 15 - 37 U/L    Alk. phosphatase 81 45 - 117 U/L    Protein, total 8.1 6.4 - 8.2 g/dL    Albumin 4.0 3.5 - 5.0 g/dL    Globulin 4.1 (H) 2.0 - 4.0 g/dL    A-G Ratio 1.0 (L) 1.1 - 2.2     LIPASE    Collection Time: 09/13/17 11:59 AM   Result Value Ref Range    Lipase 726 (H) 73 - 393 U/L         Imaging:     CT - abd Stranding around the head of the pancreas and duodenum extending into the  right anterior pararenal space consistent with pancreatitis. There is no  pancreatic mass or evidence of pancreatic ecchymosis and the body and tail of  the pancreas are normal.    Assessment:     Principal Problem:    Abdominal pain (9/13/2017)           Plan:     1. Abdominal pain probably mild pancreatitis ( by CT but enzyme level does not qualify ) : Conservative MGMT with NPO IVF pain meds     2.  Cont CIWA - for ETOH withdrawal, give Banana bag,  pt said he goes into DT will not happen in next 48-72 hrs    3. Chronic chewing tobacco use- counseled cessation    4. ETOH abuse: pt was in detox x 2 but relapsed , I discussed with him that I will be able to give him some librium on d/c but he has to go to the rehab by himself    5. H/O PUD - cont IV ppi    6.  Abnormal LFT's - from ETOH abuse counseled cessation and d/w MOM at bedside       Signed By: Haritha Munson MD     September 13, 2017

## 2017-09-13 NOTE — PROGRESS NOTES
1700- TRANSFER - IN REPORT:    Verbal report received from Charissa Triplett (name) on David Corado  being received from ED (unit) for routine progression of care      Report consisted of patients Situation, Background, Assessment and   Recommendations(SBAR). Information from the following report(s) SBAR, Kardex, ED Summary, Intake/Output, MAR and Recent Results was reviewed with the receiving nurse. Opportunity for questions and clarification was provided. Assessment completed upon patients arrival to unit and care assumed.

## 2017-09-13 NOTE — ED PROVIDER NOTES
HPI Comments: 28 y.o. male with past medical history significant for gastric ulcer and dystonia who presents from home with mother with chief complaint of abdominal pain. The pt c/o sudden onset L sided abdominal pain/epigastric pain with nausea and lower back pain that started this morning. The pt had a BM one night ago and reports that he has not had flatulence since the onset of the episode. The reports that he drank Gatorade this morning and it did not worsen the pain. The pt drinks 12-30 beers a day and his last drink was at 0200. This episode feels different and worse than his last gastric ulcer. The pt is allergic to penicillin. The pt denies diarrhea, blood in stool, fever, and prior abdominal surgery. There are no other acute medical concerns at this time. Social hx: Pt denies smoking, daily EtOH use  PCP: Maryjo Spurling, NP    Note written by Oracio Plummer, as dictated by Everett Townsend MD 12:10 PM      The history is provided by the patient. No  was used. Past Medical History:   Diagnosis Date    Dystonia     Gastric ulcer        Past Surgical History:   Procedure Laterality Date    HX ORTHOPAEDIC      rib fracture- LEFT and RIGHT         Family History:   Problem Relation Age of Onset    Diabetes Mother     Alcohol abuse Mother     Diabetes Maternal Grandfather     Hypertension Father     Alcohol abuse Father     Alcohol abuse Brother        Social History     Social History    Marital status: SINGLE     Spouse name: N/A    Number of children: N/A    Years of education: N/A     Occupational History    Not on file. Social History Main Topics    Smoking status: Current Some Day Smoker    Smokeless tobacco: Current User      Comment: and chews tobacc0    Alcohol use 0.0 oz/week     18 - 30 Cans of beer per week      Comment: 18-24 beers in a day.      Drug use: No    Sexual activity: Yes     Partners: Female     Other Topics Concern    Not on file     Social History Narrative         ALLERGIES: Pcn [penicillins]    Review of Systems   Constitutional: Negative for fever. Gastrointestinal: Positive for abdominal pain and nausea. Negative for blood in stool and diarrhea. Musculoskeletal: Positive for back pain (lower). All other systems reviewed and are negative. Vitals:    09/13/17 1132   BP: 131/82   Pulse: 71   Resp: 16   Temp: 97.5 °F (36.4 °C)   SpO2: 96%   Weight: 72.2 kg (159 lb 2 oz)   Height: 6' 1\" (1.854 m)            Physical Exam   Constitutional: He is oriented to person, place, and time. He appears well-developed and well-nourished. No distress. HENT:   Head: Normocephalic and atraumatic. Eyes: Conjunctivae are normal.   Neck: Normal range of motion. Cardiovascular: Normal rate, regular rhythm, normal heart sounds and intact distal pulses. Exam reveals no friction rub. No murmur heard. Pulmonary/Chest: Effort normal and breath sounds normal. No respiratory distress. He has no wheezes. He has no rales. He exhibits no tenderness. Abdominal: Soft. Bowel sounds are normal. He exhibits no distension. There is tenderness. There is no rebound and no guarding. Mild epigastric and ruq ttp   Musculoskeletal: Normal range of motion. Neurological: He is alert and oriented to person, place, and time. Coordination normal.   Skin: Skin is warm and dry. He is not diaphoretic. No pallor. Psychiatric: He has a normal mood and affect. His behavior is normal.   Nursing note and vitals reviewed. MDM  Number of Diagnoses or Management Options  Alcohol abuse:   Alcohol-induced acute pancreatitis without infection or necrosis:   Diagnosis management comments: Does not sound like sbo. ?  Pancreatitis vs gastritis doubt cholecystitis but will check lft's and white count       Amount and/or Complexity of Data Reviewed  Clinical lab tests: ordered and reviewed  Tests in the radiology section of CPT®: ordered and reviewed  Obtain history from someone other than the patient: yes (mom)  Discuss the patient with other providers: yes (hospitalist)    Patient Progress  Patient progress: stable    ED Course       Procedures  2:02 PM  Spoke with ct. They will verify ct    CONSULT NOTE:  2:38 PM Tamara Reaves MD spoke with Dr. Patt Ulloa MD, Consult for Hospitalist and Medicine. Discussed available diagnostic tests and clinical findings. He is in agreement with care plans as outlined. He will see and admit the pt.

## 2017-09-13 NOTE — PROGRESS NOTES
Admission Medication Reconciliation:    Comments/Recommendations: This medication history was obtained from patient; (s)he appears to be a good historian. An RX Query is available. Medications added: none  Medications deleted: none  Medications amended: none    Last doses of medication: patient does not remember If he took dexilent today or not  Review of Allergies: reviewed      Significant PMH/Disease States:   Past Medical History:   Diagnosis Date    Dystonia     Gastric ulcer        Chief Complaint for this Admission:    Chief Complaint   Patient presents with    Abdominal Pain       Allergies:  Pcn [penicillins]    Prior to Admission Medications:   Prior to Admission Medications   Prescriptions Last Dose Informant Patient Reported? Taking? Dexlansoprazole (DEXILANT) 60 mg CpDB   No Yes   Sig: Take 1 Cap by mouth daily. acetaminophen (TYLENOL) 325 mg tablet   Yes Yes   Sig: Take  by mouth every four (4) hours as needed for Pain. Facility-Administered Medications: None         Thank you for allowing me to participate in the care of this patient. Please contact the pharmacy () or the medication reconciliation pharmacy () with any questions.     Loi Tipton, LamontD

## 2017-09-13 NOTE — PROGRESS NOTES
1930-  Patient resting in bed. CIWA 0. Visitor at bedside. Bedside shift change report given to [id number] [facility] rn (oncoming nurse) by Elvira Reid (offgoing nurse). Report included the following information SBAR, Kardex, Procedure Summary, Intake/Output, MAR and Recent Results.

## 2017-09-13 NOTE — ED TRIAGE NOTES
Pt c/o generalized abd pain starting this am, denies fever, +nausea, denies diarrhea or constipation , denies urinary symptoms , denies any rectal bleeding or vomiting blood, pt stated he drinks everyday, denies black or tarry stool

## 2017-09-13 NOTE — ROUTINE PROCESS
TRANSFER - OUT REPORT:    Verbal report given to ROSHNI [facility]) on Federico Gillette  being transferred to [facility](unit) for routine progression of care       Report consisted of patients Situation, Background, Assessment and   Recommendations(SBAR). Information from the following report(s) SBAR and ED Summary was reviewed with the receiving nurse. Lines:   Peripheral IV 09/13/17 Left Antecubital (Active)        Opportunity for questions and clarification was provided.       Patient transported with:   Veezeon

## 2017-09-13 NOTE — IP AVS SNAPSHOT
[address] [facility] [address] 
[phone] Patient: Nydia Schmidt MRN: [id number] :1985 You are allergic to the following Allergen Reactions Pcn (Penicillins) Hives Recent Documentation Height Weight BMI Smoking Status 1.854 m 72.2 kg 20.99 kg/m2 Current Some Day Smoker Emergency Contacts Name Discharge Info Relation Home Work Mobile [employer] CAREGIVER [3] Parent [1] [phone] Ranjit Menendez NO [2] Friend [5] [phone] About your hospitalization You were admitted on:  2017 You last received care in the:  [facility] 5 OBSERVATION You were discharged on:  September 15, 2017 Unit phone number:  [phone] Why you were hospitalized Your primary diagnosis was:  Abdominal Pain Your diagnoses also included:  Alcohol Use Disorder (Hcc), Alcohol-Induced Acute Pancreatitis, Elevated Liver Enzymes, Acute Pancreatitis, Pancreatitis Providers Seen During Your Hospitalizations Provider Role Specialty Primary office phone Ajay Jaquez MD Attending Provider Emergency Medicine [phone] Baltazar Tipton MD Attending Provider Internal Medicine [phone] Scooter Hightower MD Attending Provider Internal Medicine [phone] Your Primary Care Physician (PCP) Primary Care Physician Office Phone Office Fax SEBASTIAN TORRES [phone] ** None ** Follow-up Information Follow up With Details Comments Contact Info Letha Sellers NP In 1 week hospital follow up [address] [facility] [address] [facility] 
[phone] Current Discharge Medication List  
  
CONTINUE these medications which have NOT CHANGED Dose & Instructions Dispensing Information Comments Morning Noon Evening Bedtime Dexlansoprazole 60 mg Cpdb Commonly known as:  DEXILANT Your last dose was: Your next dose is:    
   
   
 Dose:  60 mg Take 1 Cap by mouth daily. Quantity:  30 Cap Refills:  5  
     
   
   
   
  
 TYLENOL 325 mg tablet Generic drug:  acetaminophen Your last dose was: Your next dose is: Take  by mouth every four (4) hours as needed for Pain. Refills:  0 Discharge Instructions Discharge Instructions PATIENT ID: Rich Wade MRN: [id number] YOB: 1985 DATE OF ADMISSION: 9/13/2017 11:43 AM   
DATE OF DISCHARGE: 9/15/2017 PRIMARY CARE PROVIDER: Marjorie Evans NP  
 
ATTENDING PHYSICIAN: Haroon Burkett MD 
DISCHARGING PROVIDER: Osmel Deras NP To contact this individual call [phone] and ask the  to page. If unavailable ask to be transferred the Adult Hospitalist Department. DISCHARGE DIAGNOSES Acute Pancreatitis CONSULTATIONS: IP CONSULT TO HOSPITALIST 
 
PROCEDURES/SURGERIES: * No surgery found * PENDING TEST RESULTS:  
At the time of discharge the following test results are still pending: none FOLLOW UP APPOINTMENTS:  
Follow-up Information Follow up With Details Comments Contact Info Marjorie Evans NP In 1 week hospital follow up [address] 
[phone] ADDITIONAL CARE RECOMMENDATIONS:  
1. Make sure to take your Dexilant as prescribed. 2. Follow up with your pcp within 1 week for hospital follow up. We would like you to have your liver enzymes rechecked at the follow up as some of the levels were elevated here. 3. Stop drinking alcohol. DIET: Gi lite ACTIVITY: as tolerated WOUND CARE: none EQUIPMENT needed: none DISCHARGE MEDICATIONS: 
 See Medication Reconciliation Form · It is important that you take the medication exactly as they are prescribed.   
· Keep your medication in the bottles provided by the pharmacist and keep a list of the medication names, dosages, and times to be taken in your wallet. · Do not take other medications without consulting your doctor. NOTIFY YOUR PHYSICIAN FOR ANY OF THE FOLLOWING:  
Fever over 101 degrees for 24 hours. Chest pain, shortness of breath, fever, chills, nausea, vomiting, diarrhea, change in mentation, falling, weakness, bleeding. Severe pain or pain not relieved by medications. Or, any other signs or symptoms that you may have questions about. DISPOSITION: 
 X Home With: 
 OT  PT  [place]  RN  
  
 SNF/Inpatient Rehab/LTAC Independent/assisted living Hospice Other: CDMP Checked: Yes X PROBLEM LIST Updated: Yes X Signed:  
Tee Galaviz NP 
9/15/2017 
10:33 AM 
 
Discharge Orders None [email or web address] Announcement We are excited to announce that we are making your provider's discharge notes available to you in [email or web address]. You will see these notes when they are completed and signed by the physician that discharged you from your recent hospital stay. If you have any questions or concerns about any information you see in [email or web address], please call the Health Information Department where you were seen or reach out to your Primary Care Provider for more information about your plan of care. Introducing [facility]! Peter Patel introduces [email or web address] patient portal. Now you can access parts of your medical record, email your doctor's office, and request medication refills online. 1. In your internet browser, go to [email or web address] 2. Click on the First Time User? Click Here link in the Sign In box. You will see the New Member Sign Up page. 3. Enter your [email or web address] Access Code exactly as it appears below. You will not need to use this code after youve completed the sign-up process. If you do not sign up before the expiration date, you must request a new code. · [email or web address] Access Code: [email or web address] Expires: 10/15/2017  2:20 PM 
 4. Enter the last four digits of your Social Security Number (xxxx) and Date of Birth (mm/dd/yyyy) as indicated and click Submit. You will be taken to the next sign-up page. 5. Create a [employer] ID. This will be your [employer] login ID and cannot be changed, so think of one that is secure and easy to remember. 6. Create a [employer] password. You can change your password at any time. 7. Enter your Password Reset Question and Answer. This can be used at a later time if you forget your password. 8. Enter your e-mail address. You will receive e-mail notification when new information is available in [address] 9. Click Sign Up. You can now view and download portions of your medical record. 10. Click the Download Summary menu link to download a portable copy of your medical information. If you have questions, please visit the Frequently Asked Questions section of the [employer] website. Remember, [employer] is NOT to be used for urgent needs. For medical emergencies, dial 911. Now available from your iPhone and Android! General Information Please provide this summary of care documentation to your next provider. Patient Signature:  ____________________________________________________________ Date:  ____________________________________________________________  
  
[facility] Provider Signature:  ____________________________________________________________ Date:  ____________________________________________________________

## 2017-09-14 LAB
ANION GAP SERPL CALC-SCNC: 12 MMOL/L (ref 5–15)
APPEARANCE UR: CLEAR
BACTERIA URNS QL MICRO: NEGATIVE /HPF
BASOPHILS # BLD: 0 K/UL (ref 0–0.1)
BASOPHILS NFR BLD: 0 % (ref 0–1)
BILIRUB UR QL: NEGATIVE
BUN SERPL-MCNC: 4 MG/DL (ref 6–20)
BUN/CREAT SERPL: 7 (ref 12–20)
CALCIUM SERPL-MCNC: 8.6 MG/DL (ref 8.5–10.1)
CHLORIDE SERPL-SCNC: 104 MMOL/L (ref 97–108)
CO2 SERPL-SCNC: 24 MMOL/L (ref 21–32)
COLOR UR: ABNORMAL
CREAT SERPL-MCNC: 0.59 MG/DL (ref 0.7–1.3)
EOSINOPHIL # BLD: 0.1 K/UL (ref 0–0.4)
EOSINOPHIL NFR BLD: 2 % (ref 0–7)
EPITH CASTS URNS QL MICRO: ABNORMAL /LPF
ERYTHROCYTE [DISTWIDTH] IN BLOOD BY AUTOMATED COUNT: 12.5 % (ref 11.5–14.5)
GLUCOSE SERPL-MCNC: 78 MG/DL (ref 65–100)
GLUCOSE UR STRIP.AUTO-MCNC: NEGATIVE MG/DL
HCT VFR BLD AUTO: 43.6 % (ref 36.6–50.3)
HGB BLD-MCNC: 15.2 G/DL (ref 12.1–17)
HGB UR QL STRIP: NEGATIVE
HYALINE CASTS URNS QL MICRO: ABNORMAL /LPF (ref 0–5)
KETONES UR QL STRIP.AUTO: 15 MG/DL
LEUKOCYTE ESTERASE UR QL STRIP.AUTO: NEGATIVE
LIPASE SERPL-CCNC: 643 U/L (ref 73–393)
LYMPHOCYTES # BLD: 0.8 K/UL (ref 0.8–3.5)
LYMPHOCYTES NFR BLD: 18 % (ref 12–49)
MCH RBC QN AUTO: 32.5 PG (ref 26–34)
MCHC RBC AUTO-ENTMCNC: 34.9 G/DL (ref 30–36.5)
MCV RBC AUTO: 93.4 FL (ref 80–99)
MONOCYTES # BLD: 0.8 K/UL (ref 0–1)
MONOCYTES NFR BLD: 17 % (ref 5–13)
NEUTS SEG # BLD: 2.9 K/UL (ref 1.8–8)
NEUTS SEG NFR BLD: 63 % (ref 32–75)
NITRITE UR QL STRIP.AUTO: NEGATIVE
PH UR STRIP: 5.5 [PH] (ref 5–8)
PLATELET # BLD AUTO: 206 K/UL (ref 150–400)
POTASSIUM SERPL-SCNC: 3.6 MMOL/L (ref 3.5–5.1)
PROT UR STRIP-MCNC: NEGATIVE MG/DL
RBC # BLD AUTO: 4.67 M/UL (ref 4.1–5.7)
RBC #/AREA URNS HPF: ABNORMAL /HPF (ref 0–5)
SODIUM SERPL-SCNC: 140 MMOL/L (ref 136–145)
SP GR UR REFRACTOMETRY: 1.01 (ref 1–1.03)
UA: UC IF INDICATED,UAUC: ABNORMAL
UROBILINOGEN UR QL STRIP.AUTO: 1 EU/DL (ref 0.2–1)
WBC # BLD AUTO: 4.7 K/UL (ref 4.1–11.1)
WBC URNS QL MICRO: ABNORMAL /HPF (ref 0–4)

## 2017-09-14 PROCEDURE — 80048 BASIC METABOLIC PNL TOTAL CA: CPT | Performed by: HOSPITALIST

## 2017-09-14 PROCEDURE — 83690 ASSAY OF LIPASE: CPT | Performed by: HOSPITALIST

## 2017-09-14 PROCEDURE — 81001 URINALYSIS AUTO W/SCOPE: CPT | Performed by: NURSE PRACTITIONER

## 2017-09-14 PROCEDURE — 74011250637 HC RX REV CODE- 250/637: Performed by: NURSE PRACTITIONER

## 2017-09-14 PROCEDURE — 74011250637 HC RX REV CODE- 250/637: Performed by: HOSPITALIST

## 2017-09-14 PROCEDURE — 74011250636 HC RX REV CODE- 250/636: Performed by: NURSE PRACTITIONER

## 2017-09-14 PROCEDURE — 36415 COLL VENOUS BLD VENIPUNCTURE: CPT | Performed by: HOSPITALIST

## 2017-09-14 PROCEDURE — 85025 COMPLETE CBC W/AUTO DIFF WBC: CPT | Performed by: HOSPITALIST

## 2017-09-14 PROCEDURE — 99218 HC RM OBSERVATION: CPT

## 2017-09-14 PROCEDURE — 65270000029 HC RM PRIVATE

## 2017-09-14 PROCEDURE — 74011250636 HC RX REV CODE- 250/636: Performed by: HOSPITALIST

## 2017-09-14 RX ORDER — HYDROMORPHONE HYDROCHLORIDE 1 MG/ML
.5-1 INJECTION, SOLUTION INTRAMUSCULAR; INTRAVENOUS; SUBCUTANEOUS
Status: DISCONTINUED | OUTPATIENT
Start: 2017-09-14 | End: 2017-09-15

## 2017-09-14 RX ORDER — HYDROCODONE BITARTRATE AND ACETAMINOPHEN 5; 325 MG/1; MG/1
1 TABLET ORAL
Status: DISCONTINUED | OUTPATIENT
Start: 2017-09-14 | End: 2017-09-15 | Stop reason: HOSPADM

## 2017-09-14 RX ADMIN — LORAZEPAM 1 MG: 2 INJECTION INTRAMUSCULAR; INTRAVENOUS at 23:13

## 2017-09-14 RX ADMIN — PANTOPRAZOLE SODIUM 40 MG: 40 TABLET, DELAYED RELEASE ORAL at 09:38

## 2017-09-14 RX ADMIN — SODIUM CHLORIDE 125 ML/HR: 900 INJECTION, SOLUTION INTRAVENOUS at 17:24

## 2017-09-14 RX ADMIN — HYDROMORPHONE HYDROCHLORIDE 1 MG: 1 INJECTION, SOLUTION INTRAMUSCULAR; INTRAVENOUS; SUBCUTANEOUS at 09:49

## 2017-09-14 RX ADMIN — Medication 10 ML: at 21:29

## 2017-09-14 RX ADMIN — HYDROMORPHONE HYDROCHLORIDE 1 MG: 1 INJECTION, SOLUTION INTRAMUSCULAR; INTRAVENOUS; SUBCUTANEOUS at 21:29

## 2017-09-14 RX ADMIN — HYDROMORPHONE HYDROCHLORIDE 1 MG: 1 INJECTION, SOLUTION INTRAMUSCULAR; INTRAVENOUS; SUBCUTANEOUS at 17:24

## 2017-09-14 RX ADMIN — LORAZEPAM 1 MG: 2 INJECTION INTRAMUSCULAR; INTRAVENOUS at 05:08

## 2017-09-14 RX ADMIN — HYDROCODONE BITARTRATE AND ACETAMINOPHEN 1 TABLET: 5; 325 TABLET ORAL at 12:21

## 2017-09-14 RX ADMIN — Medication 10 ML: at 06:00

## 2017-09-14 RX ADMIN — SODIUM CHLORIDE 125 ML/HR: 900 INJECTION, SOLUTION INTRAVENOUS at 09:38

## 2017-09-14 RX ADMIN — HYDROMORPHONE HYDROCHLORIDE 1 MG: 1 INJECTION, SOLUTION INTRAMUSCULAR; INTRAVENOUS; SUBCUTANEOUS at 02:10

## 2017-09-14 NOTE — PROGRESS NOTES
Hospitalist Progress Note  Tee Galaviz NP  Office: [phone]  Cell: [phone] 7a-5p      Date of Service:  2017  NAME:  Nivia Holstein  :  1985  MRN:  [id number]      Admission Summary:   28 yom who presents with abdominal pain since this morning dull ache in nature mostly in upper abdomen non radiating. In brief  The pt had a BM one night ago and reports that he has not had flatulence since the onset of the episode. Food and drink did not worsen pain significantly. The pt drinks 12-30 beers a day and his last drink was at 0200. This episode feels different and worse than his last gastric ulcer. pt has PMH of gastric ulcer and dystonia who presents from home with mother CT done in ER showing pancreatitis but lipase in < 1000 . Interval history / Subjective:     Patient reports increased pain s/p attempting GI lite. Reports increased sharp RUQ/RLQ pain that radiates to the back. Notes referred pain the LLQ. Denies any nausea, vomiting, or diarrhea. Assessment & Plan:     Abdominal pain likely d/t acute on chronic pancreatitis   -CT abd with evidence of pancreatitis  -lipase slightly elevated  -increased pain with eating, change to NPO  -pain mgmt, IVF     ETOH abuse   -pt acknowledges problem however not ready to quit   -CIWA protocol  -banana bag    Tobacco abuse  -chewing tobacco, cessation education provided     H/o PUD   -PPI    Elevated liver enzymes   -on chart review, actually improved   -likely d/t to etoh abuse     Code status: Full    Care Plan discussed with: Patient/Family and Nurse Dr Blanche Ignacio  Disposition: Home w/Family and TBD     Hospital Problems  Date Reviewed: 2017          Codes Class Noted POA    * (Principal)Abdominal pain ICD-10-CM: R10.9  ICD-9-CM: 789.00  2017 Unknown                Review of Systems:   A comprehensive review of systems was negative except for that written in the HPI. Vital Signs:    Last 24hrs VS reviewed since prior progress note. Most recent are:  Visit Vitals    /75 (BP 1 Location: Right arm, BP Patient Position: At rest)    Pulse (!) 59    Temp 97.7 °F (36.5 °C)    Resp 16    Ht 6' 1\" (1.854 m)    Wt 72.2 kg (159 lb 2 oz)    SpO2 97%    BMI 20.99 kg/m2         Intake/Output Summary (Last 24 hours) at 09/14/17 1629  Last data filed at 09/14/17 0844   Gross per 24 hour   Intake          2056.25 ml   Output                0 ml   Net          2056.25 ml        Physical Examination:             Constitutional:  No acute distress, cooperative, pleasant    ENT:  Oral mucous moist, oropharynx benign. Neck supple,    Resp:  CTA bilaterally. No wheezing/rhonchi/rales. No accessory muscle use   CV:  Regular rhythm, normal rate, no murmurs, gallops, rubs    GI:  Soft, non distended,increased tenderness in right quadrants slight tenderness in LLQ. normoactive bowel sounds, no hepatosplenomegaly     Musculoskeletal:  No edema, warm, 2+ pulses throughout    Neurologic:  Moves all extremities. AAOx3,      Psych:  Good insight, Not anxious nor agitated. Data Review:    Review and/or order of clinical lab test  Review and/or order of tests in the radiology section of CPT  Review and/or order of tests in the medicine section of CPT      Labs:     Recent Labs      09/14/17   0553  09/13/17   1159   WBC  4.7  5.6   HGB  15.2  16.7   HCT  43.6  47.1   PLT  206  232     Recent Labs      09/14/17   0553  09/13/17   1159   NA  140  138   K  3.6  3.5   CL  104  102   CO2  24  24   BUN  4*  4*   CREA  0.59*  0.55*   GLU  78  107*   CA  8.6  8.9     Recent Labs      09/14/17   0553  09/13/17   1159   SGOT   --   60*   ALT   --   45   AP   --   81   TBILI   --   1.2*   TP   --   8.1   ALB   --   4.0   GLOB   --   4.1*   LPSE  643*  726*     No results for input(s): INR, PTP, APTT in the last 72 hours.     No lab exists for component: INREXT   No results for input(s): FE, TIBC, PSAT, FERR in the last 72 hours. No results found for: FOL, RBCF   No results for input(s): PH, PCO2, PO2 in the last 72 hours. No results for input(s): CPK, CKNDX, TROIQ in the last 72 hours.     No lab exists for component: CPKMB  No results found for: CHOL, CHOLX, CHLST, CHOLV, HDL, LDL, LDLC, DLDLP, TGLX, TRIGL, TRIGP, CHHD, CHHDX  Lab Results   Component Value Date/Time    Glucose (POC) 100 08/22/2017 12:19 AM     Lab Results   Component Value Date/Time    Color YELLOW/STRAW 09/14/2017 11:44 AM    Appearance CLEAR 09/14/2017 11:44 AM    Specific gravity 1.014 09/14/2017 11:44 AM    pH (UA) 5.5 09/14/2017 11:44 AM    Protein NEGATIVE  09/14/2017 11:44 AM    Glucose NEGATIVE  09/14/2017 11:44 AM    Ketone 15 09/14/2017 11:44 AM    Bilirubin NEGATIVE  09/14/2017 11:44 AM    Urobilinogen 1.0 09/14/2017 11:44 AM    Nitrites NEGATIVE  09/14/2017 11:44 AM    Leukocyte Esterase NEGATIVE  09/14/2017 11:44 AM    Epithelial cells FEW 09/14/2017 11:44 AM    Bacteria NEGATIVE  09/14/2017 11:44 AM    WBC 0-4 09/14/2017 11:44 AM    RBC 0-5 09/14/2017 11:44 AM         Medications Reviewed:     Current Facility-Administered Medications   Medication Dose Route Frequency    HYDROcodone-acetaminophen (NORCO) 5-325 mg per tablet 1 Tab  1 Tab Oral Q4H PRN    pantoprazole (PROTONIX) tablet 40 mg  40 mg Oral DAILY    sodium chloride (NS) flush 5-10 mL  5-10 mL IntraVENous Q8H    sodium chloride (NS) flush 5-10 mL  5-10 mL IntraVENous PRN    0.9% sodium chloride infusion  125 mL/hr IntraVENous CONTINUOUS    ondansetron (ZOFRAN) injection 4 mg  4 mg IntraVENous Q4H PRN    LORazepam (ATIVAN) injection 1 mg  1 mg IntraVENous Q6H PRN    prochlorperazine (COMPAZINE) with saline injection 10 mg  10 mg IntraVENous Q6H PRN     ______________________________________________________________________  EXPECTED LENGTH OF STAY: - - -  ACTUAL LENGTH OF STAY:          0                 Sav Callahan NP

## 2017-09-14 NOTE — PROGRESS NOTES
Pt started to eat GI lite tray. Ate a few bites and then stated that he started to have sharp pain. Informed NP. Will continue to monitor pt.

## 2017-09-14 NOTE — PROGRESS NOTES
Problem: Falls - Risk of  Goal: *Absence of Falls  Document Trey Fall Risk and appropriate interventions in the flowsheet. Outcome: Progressing Towards Goal  Fall Risk Interventions:   pt instructed to call for assistance with ambulation. Continue to assess using CIWA scale.            Medication Interventions: Bed/chair exit alarm

## 2017-09-15 VITALS
RESPIRATION RATE: 18 BRPM | HEIGHT: 73 IN | OXYGEN SATURATION: 98 % | HEART RATE: 62 BPM | TEMPERATURE: 98 F | WEIGHT: 159.13 LBS | DIASTOLIC BLOOD PRESSURE: 81 MMHG | BODY MASS INDEX: 21.09 KG/M2 | SYSTOLIC BLOOD PRESSURE: 134 MMHG

## 2017-09-15 PROBLEM — K85.90 PANCREATITIS: Status: ACTIVE | Noted: 2017-09-15

## 2017-09-15 PROBLEM — K85.90 ACUTE PANCREATITIS: Status: ACTIVE | Noted: 2017-09-15

## 2017-09-15 PROBLEM — K85.20 ALCOHOL-INDUCED ACUTE PANCREATITIS: Status: ACTIVE | Noted: 2017-09-15

## 2017-09-15 PROBLEM — R74.8 ELEVATED LIVER ENZYMES: Status: ACTIVE | Noted: 2017-09-15

## 2017-09-15 LAB
ALBUMIN SERPL-MCNC: 3.8 G/DL (ref 3.5–5)
ALBUMIN/GLOB SERPL: 1.1 {RATIO} (ref 1.1–2.2)
ALP SERPL-CCNC: 75 U/L (ref 45–117)
ALT SERPL-CCNC: 34 U/L (ref 12–78)
ANION GAP SERPL CALC-SCNC: 8 MMOL/L (ref 5–15)
AST SERPL-CCNC: 45 U/L (ref 15–37)
BILIRUB SERPL-MCNC: 3.2 MG/DL (ref 0.2–1)
BUN SERPL-MCNC: 3 MG/DL (ref 6–20)
BUN/CREAT SERPL: 5 (ref 12–20)
CALCIUM SERPL-MCNC: 9.2 MG/DL (ref 8.5–10.1)
CHLORIDE SERPL-SCNC: 101 MMOL/L (ref 97–108)
CO2 SERPL-SCNC: 29 MMOL/L (ref 21–32)
CREAT SERPL-MCNC: 0.65 MG/DL (ref 0.7–1.3)
GLOBULIN SER CALC-MCNC: 3.5 G/DL (ref 2–4)
GLUCOSE SERPL-MCNC: 94 MG/DL (ref 65–100)
LIPASE SERPL-CCNC: 312 U/L (ref 73–393)
POTASSIUM SERPL-SCNC: 3.8 MMOL/L (ref 3.5–5.1)
PROT SERPL-MCNC: 7.3 G/DL (ref 6.4–8.2)
SODIUM SERPL-SCNC: 138 MMOL/L (ref 136–145)

## 2017-09-15 PROCEDURE — 74011250636 HC RX REV CODE- 250/636: Performed by: HOSPITALIST

## 2017-09-15 PROCEDURE — 99218 HC RM OBSERVATION: CPT

## 2017-09-15 PROCEDURE — 36415 COLL VENOUS BLD VENIPUNCTURE: CPT | Performed by: NURSE PRACTITIONER

## 2017-09-15 PROCEDURE — 74011250637 HC RX REV CODE- 250/637: Performed by: HOSPITALIST

## 2017-09-15 PROCEDURE — 80053 COMPREHEN METABOLIC PANEL: CPT | Performed by: NURSE PRACTITIONER

## 2017-09-15 PROCEDURE — 65270000029 HC RM PRIVATE

## 2017-09-15 PROCEDURE — 83690 ASSAY OF LIPASE: CPT | Performed by: NURSE PRACTITIONER

## 2017-09-15 RX ADMIN — PANTOPRAZOLE SODIUM 40 MG: 40 TABLET, DELAYED RELEASE ORAL at 09:18

## 2017-09-15 RX ADMIN — SODIUM CHLORIDE 125 ML/HR: 900 INJECTION, SOLUTION INTRAVENOUS at 01:17

## 2017-09-15 NOTE — PHYSICIAN ADVISORY
Letter of Status Determination:   Recommend hospitalization status upgraded from   OBSERVATION  to INPATIENT  Status     Pt Name:  Roseanne French   MR#   [address] [facility] # [id number] /  [id number]   [facility]#  [id number]   [address] [place], [place] [address]/02  @ [facility]   Hospitalization date  9/13/2017 11:43 AM   Current Attending Physician  Ming Hahn MD   Principal diagnosis  Abdominal pain      Clinicals  28 y.o. y.o  male hospitalized with above diagnosis   The pt has history of ETOH abuse. He returned to ER with severe abdominal pain and CT evidence suggests acute pancreatitis which was confirmed with Lipase and clinical findings. The pt is noted to have chronic pancreatitis and his lipase is not expected to rise high      Milliman (MCG) criteria   Does   apply Pancreatitis [id number]    STATUS DETERMINATION  Based on documented presenting clinical data, comorbid conditions, high risk of adverse events and deterioration, it is our recommendation that the patient's status should be upgraded from OBSERVATION to INPATIENT status. The final decision of the patient's hospitalization status depends on the attending physician's judgment. Additional comments     Payor: Taiwo Bishop / Plan: Martha Willis [id number] PPO / Product Type: PPO /         Nanda Jeff MD MPH FACP     Physician Advisor    [address]   [facility], [address] [place] [address]    Cell  [phone]        [phone]    .

## 2017-09-15 NOTE — DISCHARGE INSTRUCTIONS
Discharge Instructions       PATIENT ID: Kristen Schaefer  MRN: [id number]   YOB: 1985    DATE OF ADMISSION: 9/13/2017 11:43 AM    DATE OF DISCHARGE: 9/15/2017    PRIMARY CARE PROVIDER: Sindi Zamudio NP     ATTENDING PHYSICIAN: Melanie Ayala MD  DISCHARGING PROVIDER: Shin Yuong NP    To contact this individual call [phone] and ask the  to page. If unavailable ask to be transferred the Adult Hospitalist Department. DISCHARGE DIAGNOSES Acute Pancreatitis     CONSULTATIONS: IP CONSULT TO HOSPITALIST    PROCEDURES/SURGERIES: * No surgery found *    PENDING TEST RESULTS:   At the time of discharge the following test results are still pending: none    FOLLOW UP APPOINTMENTS:   Follow-up Information     Follow up With Details Comments Contact Info    Sindi Zamudio NP In 1 week hospital follow up [address]:   1. Make sure to take your Dexilant as prescribed. 2. Follow up with your pcp within 1 week for hospital follow up. We would like you to have your liver enzymes rechecked at the follow up as some of the levels were elevated here. 3. Stop drinking alcohol. DIET: Gi lite    ACTIVITY: as tolerated    WOUND CARE: none    EQUIPMENT needed: none    DISCHARGE MEDICATIONS:   See Medication Reconciliation Form    · It is important that you take the medication exactly as they are prescribed. · Keep your medication in the bottles provided by the pharmacist and keep a list of the medication names, dosages, and times to be taken in your wallet. · Do not take other medications without consulting your doctor. NOTIFY YOUR PHYSICIAN FOR ANY OF THE FOLLOWING:   Fever over 101 degrees for 24 hours. Chest pain, shortness of breath, fever, chills, nausea, vomiting, diarrhea, change in mentation, falling, weakness, bleeding. Severe pain or pain not relieved by medications.   Or, any other signs or symptoms that you may have questions about. DISPOSITION:   X Home With:   OT  PT  HH  RN       SNF/Inpatient Rehab/LTAC    Independent/assisted living    Hospice    Other:     CDMP Checked: Yes X     PROBLEM LIST Updated:   Yes X       Signed:   Dee Dee Gomez NP  9/15/2017  10:33 AM

## 2017-10-05 ENCOUNTER — OFFICE VISIT (OUTPATIENT)
Dept: FAMILY MEDICINE CLINIC | Age: 32
End: 2017-10-05

## 2017-10-05 VITALS
SYSTOLIC BLOOD PRESSURE: 127 MMHG | RESPIRATION RATE: 18 BRPM | TEMPERATURE: 97.9 F | OXYGEN SATURATION: 98 % | WEIGHT: 159.8 LBS | BODY MASS INDEX: 21.18 KG/M2 | HEART RATE: 93 BPM | HEIGHT: 73 IN | DIASTOLIC BLOOD PRESSURE: 84 MMHG

## 2017-10-05 DIAGNOSIS — R74.8 ELEVATED LIVER ENZYMES: ICD-10-CM

## 2017-10-05 DIAGNOSIS — K85.80 OTHER ACUTE PANCREATITIS, UNSPECIFIED COMPLICATION STATUS: Primary | ICD-10-CM

## 2017-10-05 DIAGNOSIS — R17 SERUM TOTAL BILIRUBIN ELEVATED: ICD-10-CM

## 2017-10-05 PROBLEM — R10.9 ABDOMINAL PAIN: Status: RESOLVED | Noted: 2017-09-13 | Resolved: 2017-10-05

## 2017-10-05 PROBLEM — K85.90 ACUTE PANCREATITIS: Status: RESOLVED | Noted: 2017-09-15 | Resolved: 2017-10-05

## 2017-10-05 NOTE — MR AVS SNAPSHOT
Visit Information Date & Time Provider Department Dept. Phone Encounter #  
 10/5/2017 11:30 AM Leonidas Garner [facility] [phone] [id number] Upcoming Health Maintenance Date Due Pneumococcal 19-64 Medium Risk (1 of 1 - PPSV23) 5/25/2020* DTaP/Tdap/Td series (2 - Td) 4/7/2021 *Topic was postponed. The date shown is not the original due date. Allergies as of 10/5/2017  Review Complete On: 10/5/2017 By: Leonidas Garner NP Severity Noted Reaction Type Reactions Pcn [Penicillins]  02/29/2012    Hives Current Immunizations  Never Reviewed No immunizations on file. Not reviewed this visit You Were Diagnosed With   
  
 Codes Comments Other acute pancreatitis, unspecified complication status    -  Primary ICD-10-CM: K85.80 ICD-9-CM: 102.0 Alcohol use disorder ([facility] [address]     ICD-10-CM: F10.99 ICD-9-CM: 305.00 Elevated liver enzymes     ICD-10-CM: R74.8 ICD-9-CM: 790.5 Serum total bilirubin elevated     ICD-10-CM: R17 
ICD-9-CM: 277.4 Vitals BP Pulse Temp Resp Height(growth percentile) Weight(growth percentile) 127/84 (BP 1 Location: Left arm, BP Patient Position: Sitting) 93 97.9 °F (36.6 °C) (Oral) 18 6' 1\" (1.854 m) 159 lb 12.8 oz (72.5 kg) SpO2 BMI Smoking Status 98% 21.08 kg/m2 Current Some Day Smoker Vitals History BMI and BSA Data Body Mass Index Body Surface Area 21.08 kg/m 2 1.93 m 2 Preferred Pharmacy Pharmacy Name Phone [address], [address] [phone] Your Updated Medication List  
  
   
This list is accurate as of: 10/5/17 12:01 PM.  Always use your most recent med list.  
  
  
  
  
 Dexlansoprazole 60 mg Cpdb Commonly known as:  DEXILANT Take 1 Cap by mouth daily. TYLENOL 325 mg tablet Generic drug:  acetaminophen Take  by mouth every four (4) hours as needed for Pain. We Performed the Following HEPATIC FUNCTION PANEL [82469 CPT(R)] Introducing [facility]! Kami Wrighte introduces [employer] patient portal. Now you can access parts of your medical record, email your doctor's office, and request medication refills online. 1. In your internet browser, go to [email or web address] 2. Click on the First Time User? Click Here link in the Sign In box. You will see the New Member Sign Up page. 3. Enter your [employer] Access Code exactly as it appears below. You will not need to use this code after youve completed the sign-up process. If you do not sign up before the expiration date, you must request a new code. · [employer] Access Code: [email or web address] Expires: 10/15/2017  2:20 PM 
 
4. Enter the last four digits of your Social Security Number (xxxx) and Date of Birth (mm/dd/yyyy) as indicated and click Submit. You will be taken to the next sign-up page. 5. Create a [employer] ID. This will be your [employer] login ID and cannot be changed, so think of one that is secure and easy to remember. 6. Create a [employer] password. You can change your password at any time. 7. Enter your Password Reset Question and Answer. This can be used at a later time if you forget your password. 8. Enter your e-mail address. You will receive e-mail notification when new information is available in [address] 9. Click Sign Up. You can now view and download portions of your medical record. 10. Click the Download Summary menu link to download a portable copy of your medical information. If you have questions, please visit the Frequently Asked Questions section of the [employer] website. Remember, [employer] is NOT to be used for urgent needs. For medical emergencies, dial 911. Now available from your iPhone and Android! Please provide this summary of care documentation to your next provider. Your primary care clinician is listed as Gust. If you have any questions after today's visit, please call [phone].

## 2017-10-05 NOTE — PROGRESS NOTES
1. Have you been to the ER, urgent care clinic since your last visit? Hospitalized since your last visit? No    2. Have you seen or consulted any other health care providers outside of the [address] [place] [address] [place] since your last visit? Include any pap smears or colon screening. No       Chief Complaint   Patient presents with   [facility] Follow Up     patient here for hospital follow up(Dx: Abdominal pain, Pancreatitis, etoh abuse, elevated liver enzymes -[facility])     Learning assessment complete    Abuse Screening Questionnaire 10/5/2017   Do you ever feel afraid of your partner? N   Are you in a relationship with someone who physically or mentally threatens you? N   Is it safe for you to go home? Y     Fall Risk Assessment, last 12 mths 10/5/2017   Able to walk?  Yes

## 2017-10-05 NOTE — PROGRESS NOTES
HISTORY OF PRESENT ILLNESS  Jeremie Becerra is a 28 y.o. male. HPI  Follow up hospitalization. Records reviewed and summarized as follows: Admitted to [facility] with abdominal pain on 9/13/17. CT showed acute pancreatitis with elevated lipase. History of alcohol abuse. Discharged 9/15/17. Lipase at normal levels. Bilirubin was elevated at 3.2. No sign of gall bladder disease on CT scan. Reports sx came on after drinking to excess at a bar. Has since cut back to about 10 beers daily. Abdominal pain has resolved. Past Medical History:   Diagnosis Date    Dystonia     Gastric ulcer      Patient Active Problem List   Diagnosis Code    Gastritis K29.70    Alcohol use disorder ([facility] [address] F10.99    Alcohol-induced acute pancreatitis K85.20    Elevated liver enzymes R74.8    Pancreatitis K85.90     Current Outpatient Prescriptions   Medication Sig    acetaminophen (TYLENOL) 325 mg tablet Take  by mouth every four (4) hours as needed for Pain.  Dexlansoprazole (DEXILANT) 60 mg CpDB Take 1 Cap by mouth daily. No current facility-administered medications for this visit. Social History   Substance Use Topics    Smoking status: Current Some Day Smoker    Smokeless tobacco: Current User      Comment: and chews tobacc0    Alcohol use 0.0 oz/week     18 - 30 Cans of beer per week      Comment: 18-24 beers in a day. Visit Vitals    /84 (BP 1 Location: Left arm, BP Patient Position: Sitting)    Pulse 93    Temp 97.9 °F (36.6 °C) (Oral)    Resp 18    Ht 6' 1\" (1.854 m)    Wt 159 lb 12.8 oz (72.5 kg)    SpO2 98%    BMI 21.08 kg/m2     Review of Systems   Constitutional: Negative for chills, fever, malaise/fatigue and weight loss. Respiratory: Negative for cough and shortness of breath. Cardiovascular: Negative for chest pain, palpitations and leg swelling. Gastrointestinal: Negative for abdominal pain, blood in stool, melena, nausea and vomiting. Neurological: Negative for weakness. All other systems reviewed and are negative. Physical Exam   Constitutional: No distress. Thin appearing. Neck: Neck supple. Cardiovascular: Normal rate, regular rhythm and normal heart sounds. Pulmonary/Chest: Effort normal and breath sounds normal.   Abdominal: Soft. He exhibits no distension. There is no tenderness. There is no guarding. Musculoskeletal: He exhibits no edema. Lymphadenopathy:     He has no cervical adenopathy. Neurological: He is alert. Skin: Skin is warm and dry. Psychiatric: He has a normal mood and affect. ASSESSMENT and PLAN  Diagnoses and all orders for this visit:    1. Other acute pancreatitis, unspecified complication status  Clinically resolved. 2. Alcohol use disorder ([facility] [address]  Encouraged cessation. Reports he has cut back but not ready to quit at this time. 3. Elevated liver enzymes  -     HEPATIC FUNCTION PANEL  Recheck liver enzymes. 4. Serum total bilirubin elevated  -     HEPATIC FUNCTION PANEL    Follow up 6 months or sooner prn.

## 2017-10-06 LAB
ALBUMIN SERPL-MCNC: 4.7 G/DL (ref 3.5–5.5)
ALP SERPL-CCNC: 82 IU/L (ref 39–117)
ALT SERPL-CCNC: 20 IU/L (ref 0–44)
AST SERPL-CCNC: 44 IU/L (ref 0–40)
BILIRUB DIRECT SERPL-MCNC: 0.43 MG/DL (ref 0–0.4)
BILIRUB SERPL-MCNC: 2.4 MG/DL (ref 0–1.2)
PROT SERPL-MCNC: 7.2 G/DL (ref 6–8.5)

## 2018-01-25 ENCOUNTER — HOSPITAL ENCOUNTER (EMERGENCY)
Age: 33
Discharge: HOME | End: 2018-01-25
Attending: EMERGENCY MEDICINE
Payer: COMMERCIAL

## 2018-01-25 ENCOUNTER — APPOINTMENT (OUTPATIENT)
Dept: CT IMAGING | Age: 33
End: 2018-01-25
Attending: EMERGENCY MEDICINE
Payer: COMMERCIAL

## 2018-01-25 VITALS
WEIGHT: 156 LBS | HEART RATE: 96 BPM | HEIGHT: 73 IN | RESPIRATION RATE: 16 BRPM | OXYGEN SATURATION: 95 % | DIASTOLIC BLOOD PRESSURE: 101 MMHG | TEMPERATURE: 97.4 F | SYSTOLIC BLOOD PRESSURE: 149 MMHG | BODY MASS INDEX: 20.67 KG/M2

## 2018-01-25 DIAGNOSIS — F10.920 ALCOHOLIC INTOXICATION WITHOUT COMPLICATION (HCC): Primary | ICD-10-CM

## 2018-01-25 DIAGNOSIS — S02.2XXB OPEN FRACTURE OF NASAL BONE, INITIAL ENCOUNTER: ICD-10-CM

## 2018-01-25 DIAGNOSIS — S01.21XA NASAL LACERATION, INITIAL ENCOUNTER: ICD-10-CM

## 2018-01-25 PROCEDURE — 99283 EMERGENCY DEPT VISIT LOW MDM: CPT

## 2018-01-25 PROCEDURE — 72125 CT NECK SPINE W/O DYE: CPT

## 2018-01-25 PROCEDURE — 74011250636 HC RX REV CODE- 250/636: Performed by: EMERGENCY MEDICINE

## 2018-01-25 PROCEDURE — 70450 CT HEAD/BRAIN W/O DYE: CPT

## 2018-01-25 PROCEDURE — 77030002986 HC SUT PROL J&J -A

## 2018-01-25 PROCEDURE — 75810000293 HC SIMP/SUPERF WND  RPR

## 2018-01-25 PROCEDURE — 70486 CT MAXILLOFACIAL W/O DYE: CPT

## 2018-01-25 PROCEDURE — 77030031139 HC SUT VCRL2 J&J -A

## 2018-01-25 PROCEDURE — 74011000250 HC RX REV CODE- 250: Performed by: EMERGENCY MEDICINE

## 2018-01-25 PROCEDURE — 77030018836 HC SOL IRR NACL ICUM -A

## 2018-01-25 PROCEDURE — 90715 TDAP VACCINE 7 YRS/> IM: CPT | Performed by: EMERGENCY MEDICINE

## 2018-01-25 PROCEDURE — 90471 IMMUNIZATION ADMIN: CPT

## 2018-01-25 RX ORDER — CEPHALEXIN 500 MG/1
500 CAPSULE ORAL 2 TIMES DAILY
Qty: 14 CAP | Refills: 0 | Status: SHIPPED | OUTPATIENT
Start: 2018-01-25 | End: 2018-02-01

## 2018-01-25 RX ORDER — BACITRACIN 500 UNIT/G
1 PACKET (EA) TOPICAL
Status: COMPLETED | OUTPATIENT
Start: 2018-01-25 | End: 2018-01-25

## 2018-01-25 RX ORDER — LIDOCAINE HYDROCHLORIDE AND EPINEPHRINE 10; 10 MG/ML; UG/ML
5 INJECTION, SOLUTION INFILTRATION; PERINEURAL ONCE
Status: COMPLETED | OUTPATIENT
Start: 2018-01-25 | End: 2018-01-25

## 2018-01-25 RX ORDER — SODIUM CHLORIDE 9 MG/ML
1000 INJECTION, SOLUTION INTRAVENOUS ONCE
Status: COMPLETED | OUTPATIENT
Start: 2018-01-25 | End: 2018-01-25

## 2018-01-25 RX ADMIN — TETANUS TOXOID, REDUCED DIPHTHERIA TOXOID AND ACELLULAR PERTUSSIS VACCINE, ADSORBED 0.5 ML: 5; 2.5; 8; 8; 2.5 SUSPENSION INTRAMUSCULAR at 04:42

## 2018-01-25 RX ADMIN — BACITRACIN 1 PACKET: 500 OINTMENT TOPICAL at 04:48

## 2018-01-25 RX ADMIN — SODIUM CHLORIDE 1000 ML: 900 INJECTION, SOLUTION INTRAVENOUS at 04:42

## 2018-01-25 RX ADMIN — LIDOCAINE HYDROCHLORIDE,EPINEPHRINE BITARTRATE 50 MG: 10; .01 INJECTION, SOLUTION INFILTRATION; PERINEURAL at 04:42

## 2018-01-25 NOTE — DISCHARGE INSTRUCTIONS
Broken Nose: Care Instructions  Your Care Instructions    A broken nose is a break, or fracture, of the bone or cartilage. Most broken noses need only home care and a follow-up visit with a doctor. The swelling should go down in a few days. Bruises around your eyes and nose should go away in 2 to 3 weeks. You heal best when you take good care of yourself. Eat a variety of healthy foods, and don't smoke. Follow-up care is a key part of your treatment and safety. Be sure to make and go to all appointments, and call your doctor if you are having problems. It's also a good idea to know your test results and keep a list of the medicines you take. How can you care for yourself at home? · If you have a nasal splint or packing, leave it in place until a doctor removes it. · If your doctor prescribed antibiotics, take them as directed. Do not stop taking them just because you feel better. You need to take the full course of antibiotics. · Take decongestants as directed to help you breathe after the splint or packing is removed. Your doctor may give you a prescription or suggest over-the-counter medicine. · Be safe with medicines. Take pain medicines exactly as directed. ¨ If the doctor gave you a prescription medicine for pain, take it as prescribed. ¨ If you are not taking a prescription pain medicine, ask your doctor if you can take an over-the-counter medicine. · Put ice or a cold pack on your nose for 10 to 20 minutes at a time. Try to do this every 1 to 2 hours for the first 3 days (when you are awake) or until the swelling goes down. Put a thin cloth between the ice pack and your skin. · Sleep with your head slightly raised until the swelling goes down. Prop up your head and shoulders on pillows. · Do not play contact sports for 6 weeks. When should you call for help? Call 911 anytime you think you may need emergency care. For example, call if:  ? · You have trouble breathing.    ? · You passed out (lost consciousness). ?Call your doctor now or seek immediate medical care if:  ? · You have signs of infection, such as:  ¨ Increased pain, swelling, warmth, or redness. ¨ Red streaks leading from the area. ¨ Pus draining from the area. ¨ A fever. ? · You have clear fluid draining from your nose. ? · You have vision changes. ? · Your nose is bleeding. ? · You have new or worse pain. ? Watch closely for changes in your health, and be sure to contact your doctor if:  ? · You do not get better as expected. Where can you learn more? Go to http://darinel-nova.info/. Enter Y345 in the search box to learn more about \"Broken Nose: Care Instructions. \"  Current as of: March 21, 2017  Content Version: 11.4  © 4563-6308 [employer]. Care instructions adapted under license by [employer] (which disclaims liability or warranty for this information). If you have questions about a medical condition or this instruction, always ask your healthcare professional. [place] [address] any warranty or liability for your use of this information. Cuts on the Face Closed With Stitches: Care Instructions  Your Care Instructions  A cut on your face can be on your chin, cheek, nose, forehead, eyelid, lip, or ear. The doctor used stitches to close the cut. Using stitches helps the cut heal and reduces scarring. The doctor may also have called in a specialist, such as a plastic surgeon, to close the cut. If the cut went deep and through the skin, the doctor may have put in two layers of stitches. The deeper layer brings the deep part of the cut together. These stitches will dissolve and don't need to be removed. The stitches in the upper layer are the ones you see on the cut. You will probably have a bandage. You will need to have the stitches removed, usually in 3 to 5 days. The doctor has checked you carefully, but problems can develop later.  If you notice any problems or new symptoms, get medical treatment right away. Follow-up care is a key part of your treatment and safety. Be sure to make and go to all appointments, and call your doctor if you are having problems. It's also a good idea to know your test results and keep a list of the medicines you take. How can you care for yourself at home? · Keep the cut dry for the first 24 to 48 hours. After this, you can shower if your doctor okays it. Pat the cut dry. · Don't soak the cut, such as in a bathtub. Your doctor will tell you when it's safe to get the cut wet. · If your doctor told you how to care for your cut, follow your doctor's instructions. If you did not get instructions, follow this general advice:  ¨ After the first 24 to 48 hours, wash around the cut with clean water 2 times a day. Don't use hydrogen peroxide or alcohol, which can slow healing. ¨ You may cover the cut with a thin layer of petroleum jelly, such as Vaseline, and a nonstick bandage. ¨ Apply more petroleum jelly and replace the bandage as needed. · Avoid any activity that could cause your cut to reopen. · Do not remove the stitches on your own. Your doctor will tell you when to come back to have the stitches removed. · Be safe with medicines. Take pain medicines exactly as directed. ¨ If the doctor gave you a prescription medicine for pain, take it as prescribed. ¨ If you are not taking a prescription pain medicine, ask your doctor if you can take an over-the-counter medicine. When should you call for help? Call your doctor now or seek immediate medical care if:  ? · You have new pain, or your pain gets worse. ? · The skin near the cut is cold or pale or changes color. ? · You have tingling, weakness, or numbness near the cut.   ? · The cut starts to bleed, and blood soaks through the bandage.  Oozing small amounts of blood is normal.   ? · You have symptoms of infection, such as:  ¨ Increased pain, swelling, warmth, or redness around the cut. ¨ Red streaks leading from the cut. ¨ Pus draining from the cut. ¨ A fever. ? Watch closely for changes in your health, and be sure to contact your doctor if:  ? · You do not get better as expected. Where can you learn more? Go to http://darinel-nova.info/. Enter F334 in the search box to learn more about \"Cuts on the Face Closed With Stitches: Care Instructions. \"  Current as of: March 20, 2017  Content Version: 11.4  © 6644-1637 [employer]. Care instructions adapted under license by [employer] (which disclaims liability or warranty for this information). If you have questions about a medical condition or this instruction, always ask your healthcare professional. [address] any warranty or liability for your use of this information.

## 2018-01-25 NOTE — ED PROVIDER NOTES
HPI Comments: 36-year-old male who presents emergency department for evaluation of alcohol intoxication, fall, nose laceration. Patient states he drank 18 beers tonight. Patient has a history of alcohol use and drinks daily. Patient was walking when he tripped, fell forward and struck his nose on the blade of a chainsaw. Chainsaw was not on. No loss consciousness. No nausea vomiting. No blurred vision or double vision. No chest pain, shortness of breath difficulty breathing. No neck pain. No numbness or tingling of extremities. No medications taken prior to arrival. Patient is in need of a tetanus shot. Injury occurred just prior to arrival.    Social hx  +smoker  +alcohol    Patient is a 33 y.o. male presenting with alcohol problem and fall. The history is provided by the patient. Alcohol Problem   There areno weakness present at this time. Pertinent negatives include no nausea and no vomiting. Fall   Pertinent negatives include no numbness, no abdominal pain, no nausea, no vomiting and no headaches. Past Medical History:   Diagnosis Date    Dystonia     Gastric ulcer        Past Surgical History:   Procedure Laterality Date    HX ORTHOPAEDIC      rib fracture- LEFT and RIGHT         Family History:   Problem Relation Age of Onset    Diabetes Mother     Alcohol abuse Mother     Diabetes Maternal Grandfather     Hypertension Father     Alcohol abuse Father     Alcohol abuse Brother        Social History     Social History    Marital status: SINGLE     Spouse name: N/A    Number of children: N/A    Years of education: N/A     Occupational History    Not on file. Social History Main Topics    Smoking status: Current Some Day Smoker    Smokeless tobacco: Current User      Comment: and chews tobacc0    Alcohol use 0.0 oz/week     18 - 30 Cans of beer per week      Comment: 18-24 beers in a day.      Drug use: No    Sexual activity: Yes     Partners: Female     Other Topics Concern    Not on file     Social History Narrative         ALLERGIES: Pcn [penicillins]    Review of Systems   Constitutional: Negative for chills and fatigue. HENT: Positive for nosebleeds. Negative for congestion and trouble swallowing. Eyes: Negative for visual disturbance. Respiratory: Negative for cough and shortness of breath. Cardiovascular: Negative for chest pain and palpitations. Gastrointestinal: Negative for abdominal pain, nausea and vomiting. Genitourinary: Negative for flank pain. Musculoskeletal: Negative for back pain, neck pain and neck stiffness. Skin: Positive for wound. Negative for color change. Neurological: Negative for dizziness, weakness, light-headedness, numbness and headaches. All other systems reviewed and are negative. Vitals:    01/25/18 0230   BP: (!) 149/101   Pulse: 96   Resp: 16   Temp: 97.4 °F (36.3 °C)   SpO2: 95%   Weight: 70.8 kg (156 lb)   Height: 6' 1\" (1.854 m)            Physical Exam   Constitutional: He is oriented to person, place, and time. He appears well-developed and well-nourished. HENT:   Head: Normocephalic. Right Ear: External ear normal.   Left Ear: External ear normal.   Nose: Nose normal.   Mouth/Throat: Oropharynx is clear and moist.   Bridge of nose  1 cm v shaped laceration laceration  No septal deviation, no septal hematoma    Small 1/2 cm inner lip laceration. Eyes: Conjunctivae and EOM are normal. Pupils are equal, round, and reactive to light. Right eye exhibits no discharge. Left eye exhibits no discharge. Neck: Normal range of motion. Neck supple. No cervical midline tenderness to palpation of cspine. No stepoffs, no deformity, no edema, no ecchymosis. NO midline pain with FROM of neck. Cardiovascular: Normal rate and regular rhythm. Pulmonary/Chest: Effort normal and breath sounds normal. No respiratory distress. He exhibits no tenderness. No chest wall pain with palpation. Abdominal: Soft.  Normal appearance and bowel sounds are normal. He exhibits no distension and no mass. There is no hepatosplenomegaly, splenomegaly or hepatomegaly. There is no tenderness. There is no rigidity, no rebound, no guarding, no CVA tenderness, no tenderness at McBurney's point and negative Bhandari's sign. Musculoskeletal: Normal range of motion. NO TLS spine pain with palpation. No edema, no ecchymosis, no redness or warmth. Neurological: He is alert and oriented to person, place, and time. He has normal reflexes. No cranial nerve deficit. He exhibits normal muscle tone. Coordination normal.   Skin: Skin is warm and dry. No rash noted. No erythema. Psychiatric: He has a normal mood and affect. His behavior is normal. Judgment and thought content normal.   Nursing note and vitals reviewed. MDM  Number of Diagnoses or Management Options  Alcoholic intoxication without complication [facility]):   Nasal laceration, initial encounter:   Open fracture of nasal bone, initial encounter:   Diagnosis management comments: 76-year-old male presenting for alcohol intoxication nose laceration. He is alert calm and cooperative. No distress. Patient with a brick laceration to the bridge of the nose. Lungs clear bilaterally. Abdomen soft nontender  Plan: CT, tetanus, wound repair    4:19 AM  Wounds repaired. Pt ambulatory with steady gait. Mother present for ride home. Patient's results have been reviewed with them. Patient and/or family have verbally conveyed their understanding and agreement of the patient's signs, symptoms, diagnosis, treatment and prognosis and additionally agree to follow up as recommended or return to the Emergency Room should their condition change prior to follow-up.   Discharge instructions have also been provided to the patient with some educational information regarding their diagnosis as well a list of reasons why they would want to return to the ER prior to their follow-up appointment should their condition change. Amount and/or Complexity of Data Reviewed  Discuss the patient with other providers: yes (ER attending-[facility])    Patient Progress  Patient progress: stable    ED Course       Wound Repair  Date/Time: 1/25/2018 4:17 AM  Performed by: Con Rodriguez provider: Amanda Conley  Preparation: skin prepped with Betadine, sterile field established and skin prepped with ChloraPrep  Pre-procedure re-eval: Immediately prior to the procedure, the patient was reevaluated and found suitable for the planned procedure and any planned medications. Time out: Immediately prior to the procedure a time out was called to verify the correct patient, procedure, equipment, staff and marking as appropriate. .  Location details: nose and lip  Wound length:2.5 cm or less  Anesthesia: local infiltration    Anesthesia:  Local Anesthetic: lidocaine 1% with epinephrine  Anesthetic total: 2 mL  Foreign bodies: no foreign bodies  Irrigation solution: saline  Irrigation method: syringe  Debridement: none  Skin closure: Prolene and Vicryl  Number of sutures: 8 (7 to nose, 1 to lip)  Technique: simple and interrupted  Patient tolerance: Patient tolerated the procedure well with no immediate complications  My total time at bedside, performing this procedure was 16-30 minutes (16). Comments: Wound explored bloodless field. No bone exposed. No foreign bodies to lip or nose. Wounds cleaned extensively and irrigated          Pt case including HPI, PE, and all available lab and radiology results has been discussed with attending physician. Opportunity to evaluate patient has been provided to ER attending. Discharge and prescription plan has been agreed upon. Pt case including HPI, PE, and all available lab and radiology results has been discussed with attending physician. Opportunity to evaluate patient has been provided to ER attending. Discharge and prescription plan has been agreed upon.

## 2018-01-25 NOTE — ED NOTES
Patient received and voiced understanding of discharge instructions, patient ambulate out w/o complaint, no distress noted.

## 2018-01-25 NOTE — ED TRIAGE NOTES
Patient arrives to ED with c/o GLF hitting his nose on a chainsaw that was on the ground (the chainsaw wan NOT running), patient presents with a laceration to the bridge of his nose, bleeding well controlled, ++ ETOH, patient states he drank @ 18 beers today.

## 2018-01-25 NOTE — Clinical Note
Keep wound covered for next 48 hours. Place ice in a bag and apply to nose for 20 minutes 2-3 times a day for next 48 hours. Keep dry for next 2 days. Keep head elevated for next 48 hours. After 2 days, gently wash with soap and water and apply anti biotic ointment. Return to ER for any redness, pus, discharge, swelling, pus or discharge, fever over 101. KEFLEX:1 PILL EVERY 12 HOURS FOR 7 DAYS. SUTURES OUT IN 5-7 DAYS

## 2018-04-19 ENCOUNTER — OFFICE VISIT (OUTPATIENT)
Dept: FAMILY MEDICINE CLINIC | Age: 33
End: 2018-04-19

## 2018-04-19 VITALS
DIASTOLIC BLOOD PRESSURE: 73 MMHG | WEIGHT: 162 LBS | BODY MASS INDEX: 21.47 KG/M2 | HEART RATE: 80 BPM | HEIGHT: 73 IN | SYSTOLIC BLOOD PRESSURE: 130 MMHG | TEMPERATURE: 98.6 F | OXYGEN SATURATION: 98 % | RESPIRATION RATE: 18 BRPM

## 2018-04-19 DIAGNOSIS — R20.2 PARESTHESIA: ICD-10-CM

## 2018-04-19 DIAGNOSIS — Z00.00 ROUTINE GENERAL MEDICAL EXAMINATION AT A HEALTH CARE FACILITY: Primary | ICD-10-CM

## 2018-04-19 DIAGNOSIS — K29.20 CHRONIC ALCOHOLIC GASTRITIS WITHOUT HEMORRHAGE: ICD-10-CM

## 2018-04-19 PROBLEM — K85.90 PANCREATITIS: Status: RESOLVED | Noted: 2017-09-15 | Resolved: 2018-04-19

## 2018-04-19 PROBLEM — K85.20 ALCOHOL-INDUCED ACUTE PANCREATITIS: Status: RESOLVED | Noted: 2017-09-15 | Resolved: 2018-04-19

## 2018-04-19 NOTE — MR AVS SNAPSHOT
[address] 
[phone] Patient: Britney Santamaria MRN: [id number] :1985 Visit Information Date & Time Provider Department Dept. Phone Encounter #  
 2018  9:00 AM Chavez Pabon  [facility] [phone] [id number] Upcoming Health Maintenance Date Due Pneumococcal 19-64 Medium Risk (1 of 1 - PPSV23) 2020* DTaP/Tdap/Td series (3 - Td) 2028 *Topic was postponed. The date shown is not the original due date. Allergies as of 2018  Review Complete On: 2018 By: Chavez Pabon NP Severity Noted Reaction Type Reactions Pcn [Penicillins]  2012    Hives Current Immunizations  Never Reviewed Name Date Tdap 2018  4:42 AM  
  
 Not reviewed this visit You Were Diagnosed With   
  
 Codes Comments Routine general medical examination at a health care facility    -  Primary ICD-10-CM: Z00.00 ICD-9-CM: V70.0 Paresthesia     ICD-10-CM: R20.2 ICD-9-CM: 782.0 [facility] disorder ([facility]     ICD-10-CM: F10.99 ICD-9-CM: 305.00 Vitals BP Pulse Temp Resp Height(growth percentile) Weight(growth percentile) 130/73 (BP 1 Location: Left arm, BP Patient Position: Sitting) 80 98.6 °F (37 °C) (Oral) 18 6' 1\" (1.854 m) 162 lb (73.5 kg) SpO2 BMI Smoking Status 98% 21.37 kg/m2 Current Some Day Smoker BMI and BSA Data Body Mass Index Body Surface Area  
 21.37 kg/m 2 1.95 m 2 Preferred Pharmacy Pharmacy Name Phone [address], [address] [phone] Your Updated Medication List  
  
   
This list is accurate as of 18  9:39 AM.  Always use your most recent med list.  
  
  
  
  
 DEXILANT 60 mg Cpdb Generic drug:  Dexlansoprazole TAKE 1 CAPSULE BY MOUTH EVERY DAY. TYLENOL 325 mg tablet Generic drug:  acetaminophen Take  by mouth every four (4) hours as needed for Pain. We Performed the Following CBC W/O DIFF [37484 CPT(R)] COLLECTION VENOUS BLOOD,VENIPUNCTURE [id number] CPT(R)] FOLATE [id number] CPT(R)] LIPID PANEL [94475 CPT(R)] METABOLIC PANEL, COMPREHENSIVE [07879 CPT(R)] AL HANDLG&/OR CONVEY OF SPEC FOR TR OFFICE TO LAB [70726 CPT(R)] URINALYSIS W/ RFLX MICROSCOPIC [00946 CPT(R)] VITAMIN B12 [id number] CPT(R)] Introducing [facility]! Hernesto Hatfield introduces DocDoc patient portal. Now you can access parts of your medical record, email your doctor's office, and request medication refills online. 1. In your internet browser, go to [email or web address] 2. Click on the First Time User? Click Here link in the Sign In box. You will see the New Member Sign Up page. 3. Enter your DocDoc Access Code exactly as it appears below. You will not need to use this code after youve completed the sign-up process. If you do not sign up before the expiration date, you must request a new code. · DocDoc Access Code: [email or web address] Expires: 4/25/2018  5:25 AM 
 
4. Enter the last four digits of your Social Security Number (xxxx) and Date of Birth (mm/dd/yyyy) as indicated and click Submit. You will be taken to the next sign-up page. 5. Create a DocDoc ID. This will be your DocDoc login ID and cannot be changed, so think of one that is secure and easy to remember. 6. Create a DocDoc password. You can change your password at any time. 7. Enter your Password Reset Question and Answer. This can be used at a later time if you forget your password. 8. Enter your e-mail address. You will receive e-mail notification when new information is available in [address] 9. Click Sign Up. You can now view and download portions of your medical record.  
10. Click the Download Summary menu link to download a portable copy of your medical information. If you have questions, please visit the Frequently Asked Questions section of the NORCAT website. Remember, NORCAT is NOT to be used for urgent needs. For medical emergencies, dial 911. Now available from your iPhone and Android! Please provide this summary of care documentation to your next provider. Your primary care clinician is listed as Patricia Rosales. If you have any questions after today's visit, please call [phone].

## 2018-04-19 NOTE — PROGRESS NOTES
HISTORY OF PRESENT ILLNESS  Kvng Koroma is a 33 y.o. male. HPI  Presents for CPE. Patient is alcoholic. Drinks up to 12 beers nightly. Has been in rehab in the past with only temporary results. Expresses no interest in rehab at this time. Currently is working as a -reports his boss is also an alcoholic. He also works on a horse farm. Does not drink at work. History of gastritis, takes dexilant daily. C/o intermittent paresthesia of upper and lower extremities. Past Medical History:   Diagnosis Date    Dystonia     Gastric ulcer     Pancreatitis     alcohol induced     Patient Active Problem List   Diagnosis Code    Alcohol use disorder ([facility] F10.99    Elevated liver enzymes R74.8     Current Outpatient Prescriptions   Medication Sig    DEXILANT 60 mg CpDB TAKE 1 CAPSULE BY MOUTH EVERY DAY.  acetaminophen (TYLENOL) 325 mg tablet Take  by mouth every four (4) hours as needed for Pain. No current facility-administered medications for this visit. Social History   Substance Use Topics    Smoking status: Current Some Day Smoker    Smokeless tobacco: Current User      Comment: and chews tobacc0    Alcohol use 50.4 oz/week     84 Cans of beer per week     Visit Vitals    /73 (BP 1 Location: Left arm, BP Patient Position: Sitting)    Pulse 80    Temp 98.6 °F (37 °C) (Oral)    Resp 18    Ht 6' 1\" (1.854 m)    Wt 162 lb (73.5 kg)    SpO2 98%    BMI 21.37 kg/m2     Review of Systems   Constitutional: Negative for chills, fever and malaise/fatigue. Respiratory: Negative for cough. Cardiovascular: Negative for chest pain, palpitations and leg swelling. Gastrointestinal: Positive for heartburn (intermittent). Negative for abdominal pain, blood in stool and melena. Psychiatric/Behavioral: Positive for substance abuse. All other systems reviewed and are negative. Physical Exam   Constitutional: He is oriented to person, place, and time.  He appears well-developed and well-nourished. No distress. HENT:   Right Ear: Tympanic membrane and ear canal normal.   Left Ear: Tympanic membrane and ear canal normal.   Mouth/Throat: Oropharynx is clear and moist.   Eyes: Conjunctivae and EOM are normal. Pupils are equal, round, and reactive to light. Neck: Neck supple. Cardiovascular: Normal rate, regular rhythm and normal heart sounds. Pulmonary/Chest: Effort normal and breath sounds normal.   Abdominal: Soft. He exhibits no distension. There is no tenderness. There is no guarding. Hernia confirmed negative in the right inguinal area and confirmed negative in the left inguinal area. Genitourinary: Testes normal and penis normal.   Musculoskeletal: He exhibits no edema. Lymphadenopathy:     He has no cervical adenopathy. Neurological: He is alert and oriented to person, place, and time. No cranial nerve deficit. Coordination normal.   Skin: Skin is warm and dry. Psychiatric: He has a normal mood and affect. His behavior is normal. Thought content normal.       ASSESSMENT and PLAN  Diagnoses and all orders for this visit:    1. Routine general medical examination at a health care facility  -     LIPID PANEL  -     METABOLIC PANEL, COMPREHENSIVE  -     AL HANDLG&/OR CONVEY OF SPEC FOR TR OFFICE TO LAB  -     COLLECTION VENOUS BLOOD,VENIPUNCTURE  -     URINALYSIS W/ RFLX MICROSCOPIC  -     CBC W/O DIFF  Age appropriate health maintenance and prevention reviewed. Follow healthy diet and exercise regularly at least 4-5x weekly. Labs sent. He had a few bagel bites this am.    2. Paresthesia  -     VITAMIN B12  -     FOLATE    3. Alcohol use disorder ([facility]  Educated regarding the potential complications of alcohol abuse including liver toxicity. Strongly advised another trial of rehab. Discussed removing himself from company of alcoholics. 4. Chronic alcoholic gastritis without hemorrhage    Stable on dexilant. Follow up 6 months or sooner prn.

## 2018-04-19 NOTE — PROGRESS NOTES
Chief Complaint   Patient presents with    Complete Physical     patient not fasting     1. Have you been to the ER, urgent care clinic since your last visit? Hospitalized since your last visit? No    2. Have you seen or consulted any other health care providers outside of the [facility] since your last visit? Include any pap smears or colon screening.  No

## 2018-04-20 LAB
ALBUMIN SERPL-MCNC: 4.7 G/DL (ref 3.5–5.5)
ALBUMIN/GLOB SERPL: 1.6 {RATIO} (ref 1.2–2.2)
ALP SERPL-CCNC: 69 IU/L (ref 39–117)
ALT SERPL-CCNC: 46 IU/L (ref 0–44)
AST SERPL-CCNC: 79 IU/L (ref 0–40)
BILIRUB SERPL-MCNC: 1.5 MG/DL (ref 0–1.2)
BUN SERPL-MCNC: 6 MG/DL (ref 6–20)
BUN/CREAT SERPL: 10 (ref 9–20)
CALCIUM SERPL-MCNC: 9.4 MG/DL (ref 8.7–10.2)
CHLORIDE SERPL-SCNC: 99 MMOL/L (ref 96–106)
CHOLEST SERPL-MCNC: 243 MG/DL (ref 100–199)
CO2 SERPL-SCNC: 24 MMOL/L (ref 18–29)
CREAT SERPL-MCNC: 0.58 MG/DL (ref 0.76–1.27)
ERYTHROCYTE [DISTWIDTH] IN BLOOD BY AUTOMATED COUNT: 13.7 % (ref 12.3–15.4)
FOLATE SERPL-MCNC: 5.3 NG/ML
GFR SERPLBLD CREATININE-BSD FMLA CKD-EPI: 136 ML/MIN/1.73
GFR SERPLBLD CREATININE-BSD FMLA CKD-EPI: 157 ML/MIN/1.73
GLOBULIN SER CALC-MCNC: 2.9 G/DL (ref 1.5–4.5)
GLUCOSE SERPL-MCNC: 81 MG/DL (ref 65–99)
HCT VFR BLD AUTO: 46.1 % (ref 37.5–51)
HDLC SERPL-MCNC: 87 MG/DL
HGB BLD-MCNC: 15.6 G/DL (ref 13–17.7)
INTERPRETATION, 910389: NORMAL
LDLC SERPL CALC-MCNC: 123 MG/DL (ref 0–99)
MCH RBC QN AUTO: 32 PG (ref 26.6–33)
MCHC RBC AUTO-ENTMCNC: 33.8 G/DL (ref 31.5–35.7)
MCV RBC AUTO: 95 FL (ref 79–97)
PLATELET # BLD AUTO: 226 X10E3/UL (ref 150–379)
POTASSIUM SERPL-SCNC: 3.7 MMOL/L (ref 3.5–5.2)
PROT SERPL-MCNC: 7.6 G/DL (ref 6–8.5)
RBC # BLD AUTO: 4.87 X10E6/UL (ref 4.14–5.8)
SODIUM SERPL-SCNC: 141 MMOL/L (ref 134–144)
TRIGL SERPL-MCNC: 167 MG/DL (ref 0–149)
VIT B12 SERPL-MCNC: 522 PG/ML (ref 232–1245)
VLDLC SERPL CALC-MCNC: 33 MG/DL (ref 5–40)
WBC # BLD AUTO: 2.9 X10E3/UL (ref 3.4–10.8)

## 2018-04-23 VITALS
HEART RATE: 92 BPM | SYSTOLIC BLOOD PRESSURE: 137 MMHG | TEMPERATURE: 97.8 F | HEIGHT: 73 IN | WEIGHT: 165 LBS | RESPIRATION RATE: 18 BRPM | DIASTOLIC BLOOD PRESSURE: 95 MMHG | BODY MASS INDEX: 21.87 KG/M2

## 2018-04-23 PROCEDURE — 99283 EMERGENCY DEPT VISIT LOW MDM: CPT

## 2018-04-23 PROCEDURE — 96372 THER/PROPH/DIAG INJ SC/IM: CPT

## 2018-04-23 PROCEDURE — 90471 IMMUNIZATION ADMIN: CPT

## 2018-04-24 ENCOUNTER — HOSPITAL ENCOUNTER (EMERGENCY)
Age: 33
Discharge: HOME | End: 2018-04-24
Attending: EMERGENCY MEDICINE
Payer: COMMERCIAL

## 2018-04-24 DIAGNOSIS — S69.91XA HAND INJURY, RIGHT, INITIAL ENCOUNTER: Primary | ICD-10-CM

## 2018-04-24 PROCEDURE — 74011000250 HC RX REV CODE- 250: Performed by: PHYSICIAN ASSISTANT

## 2018-04-24 PROCEDURE — 90715 TDAP VACCINE 7 YRS/> IM: CPT | Performed by: PHYSICIAN ASSISTANT

## 2018-04-24 PROCEDURE — 74011250636 HC RX REV CODE- 250/636: Performed by: PHYSICIAN ASSISTANT

## 2018-04-24 RX ORDER — NAPROXEN 500 MG/1
500 TABLET ORAL
Qty: 20 TAB | Refills: 0 | Status: SHIPPED | OUTPATIENT
Start: 2018-04-24 | End: 2018-08-16 | Stop reason: ALTCHOICE

## 2018-04-24 RX ORDER — CEFAZOLIN SODIUM 1 G/3ML
1 INJECTION, POWDER, FOR SOLUTION INTRAMUSCULAR; INTRAVENOUS
Status: COMPLETED | OUTPATIENT
Start: 2018-04-24 | End: 2018-04-24

## 2018-04-24 RX ORDER — BACITRACIN 500 UNIT/G
1 PACKET (EA) TOPICAL
Status: COMPLETED | OUTPATIENT
Start: 2018-04-24 | End: 2018-04-24

## 2018-04-24 RX ORDER — CEPHALEXIN 500 MG/1
500 CAPSULE ORAL 4 TIMES DAILY
Qty: 40 CAP | Refills: 0 | Status: SHIPPED | OUTPATIENT
Start: 2018-04-24 | End: 2018-05-04

## 2018-04-24 RX ADMIN — TETANUS TOXOID, REDUCED DIPHTHERIA TOXOID AND ACELLULAR PERTUSSIS VACCINE, ADSORBED 0.5 ML: 5; 2.5; 8; 8; 2.5 SUSPENSION INTRAMUSCULAR at 00:48

## 2018-04-24 RX ADMIN — BACITRACIN 1 PACKET: 500 OINTMENT TOPICAL at 00:49

## 2018-04-24 RX ADMIN — CEFAZOLIN SODIUM 1 G: 1 INJECTION, POWDER, FOR SOLUTION INTRAMUSCULAR; INTRAVENOUS at 01:20

## 2018-04-24 NOTE — DISCHARGE INSTRUCTIONS
Wound Care: After Your Visit  Your Care Instructions  Taking good care of your wound at home will help it heal quickly and reduce your chance of infection. The doctor has checked you carefully, but problems can develop later. If you notice any problems or new symptoms, get medical treatment right away. Follow-up care is a key part of your treatment and safety. Be sure to make and go to all appointments, and call your doctor if you are having problems. It's also a good idea to know your test results and keep a list of the medicines you take. How can you care for yourself at home? · Clean the area with soap and water 2 times a day unless your doctor gives you different instructions. Don't use hydrogen peroxide or alcohol, which can slow healing. ¨ You may cover the wound with a thin layer of antibiotic ointment, such as bacitracin, and a nonstick bandage. ¨ Apply more ointment and replace the bandage as needed. · Take pain medicines exactly as directed. Some pain is normal with a wound, but do not ignore pain that is getting worse instead of better. You could have an infection. ¨ If the doctor gave you a prescription medicine for pain, take it as prescribed. ¨ If you are not taking a prescription pain medicine, ask your doctor if you can take an over-the-counter medicine. · Your doctor may have closed your wound with stitches (sutures), staples, or skin glue. ¨ If you have stitches, your doctor may remove them after several days to 2 weeks. Or you may have stitches that dissolve on their own. ¨ If you have staples, your doctor may remove them after 7 to 10 days. ¨ If your wound was closed with skin glue, the glue will wear off in a few days to 2 weeks. When should you call for help? Call your doctor now or seek immediate medical care if:  · You have signs of infection, such as:  ¨ Increased pain, swelling, warmth, or redness near the wound. ¨ Red streaks leading from the wound.   ¨ Pus draining from the wound. ¨ A fever. · You bleed so much from your incision that you soak one or more bandages over 2 to 4 hours. Watch closely for changes in your health, and be sure to contact your doctor if:  · The wound is not getting better each day. Where can you learn more? Go to [employer]  Enter M973 in the search box to learn more about \"Wound Care: After Your Visit. \"   © 9168-3474 [employer], [employer]. Care instructions adapted under license by Shadia Kirk (which disclaims liability or warranty for this information). This care instruction is for use with your licensed healthcare professional. If you have questions about a medical condition or this instruction, always ask your healthcare professional. [address] any warranty or liability for your use of this information. Content Version: 53.3.319138;  Last Revised: April 23, 2012

## 2018-04-24 NOTE — ED PROVIDER NOTES
HPI Comments: 34 yo male with hx of ulcer, dystonia, and pancreatitis here for evaluation of right hand injury that occurred approximately 30 hours ago. States hand accidentally got closed in car door. Seen at [facility] yesterday then advised to come to ER. Had negative xray at [facility].    Unknown TD. Patient is a 33 y.o. male presenting with hand injury. The history is provided by the patient. Hand Injury    The current episode started 2 days ago. The problem occurs constantly. The pain is present in the right fingers and right hand. The quality of the pain is described as aching. The pain is at a severity of 7/10. The pain is moderate. There has been a history of trauma. Past Medical History:   Diagnosis Date    Dystonia     Gastric ulcer     Pancreatitis     alcohol induced       Past Surgical History:   Procedure Laterality Date    HX ORTHOPAEDIC      rib fracture- LEFT and RIGHT         Family History:   Problem Relation Age of Onset    Diabetes Mother     Alcohol abuse Mother     Diabetes Maternal Grandfather     Hypertension Father     Alcohol abuse Father     Alcohol abuse Brother        Social History     Social History    Marital status: SINGLE     Spouse name: N/A    Number of children: N/A    Years of education: N/A     Occupational History    Not on file. Social History Main Topics    Smoking status: Current Some Day Smoker    Smokeless tobacco: Current User      Comment: and chews tobacc0    Alcohol use 50.4 oz/week     84 Cans of beer per week    Drug use: No    Sexual activity: Yes     Partners: Female     Other Topics Concern    Not on file     Social History Narrative         ALLERGIES: Pcn [penicillins]    Review of Systems   Constitutional: Negative for activity change and fever. HENT: Negative for facial swelling. Eyes: Negative for discharge. Respiratory: Negative for cough. Cardiovascular: Negative for leg swelling.    Gastrointestinal: Negative for abdominal distention. Musculoskeletal: Positive for myalgias. Skin: Positive for wound. Neurological: Negative for seizures and syncope. Psychiatric/Behavioral: Negative for behavioral problems. Vitals:    04/23/18 2324   BP: (!) 137/95   Pulse: 92   Resp: 18   Temp: 97.8 °F (36.6 °C)   Weight: 74.8 kg (165 lb)   Height: 6' 1\" (1.854 m)            Physical Exam   Constitutional: He is oriented to person, place, and time. He appears well-nourished. HENT:   Head: Normocephalic and atraumatic. Eyes: Pupils are equal, round, and reactive to light. Neck: Normal range of motion. Cardiovascular: Normal rate and regular rhythm. Pulses:       Radial pulses are 2+ on the right side   Pulmonary/Chest: Effort normal and breath sounds normal.   Abdominal: Soft. There is no tenderness. Musculoskeletal: Normal range of motion. He exhibits no edema or tenderness. Hands:  Neurological: He is alert and oriented to person, place, and time. Skin: Skin is warm and dry. Psychiatric: He has a normal mood and affect. Nursing note and vitals reviewed. MDM  Number of Diagnoses or Management Options  Diagnosis management comments: 32 yo male with right hand/finger injury; >24 hours old; will clean and treat with antibiotics; hands followup. SATURNINO Rothman         Amount and/or Complexity of Data Reviewed  Tests in the radiology section of CPT®: ordered and reviewed  Independent visualization of images, tracings, or specimens: yes          ED Course       Procedures    Xray noted; no fx noted on xrays. SATURNINO Rothman     Discussed case with attending Physician Odell Mcclain. Agrees with care and will D/C with follow up. Patient has been reassessed. Feeling much better. Reviewed medications and radiographics with patient. Ready to discharge home. Patient's results have been reviewed with them.   Patient and/or family have verbally conveyed their understanding and agreement of the patient's signs, symptoms, diagnosis, treatment and prognosis and additionally agree to follow up as recommended or return to the Emergency Room should their condition change prior to follow-up. Discharge instructions have also been provided to the patient with some educational information regarding their diagnosis as well a list of reasons why they would want to return to the ER prior to their follow-up appointment should their condition change.   SATURNINO Felix

## 2018-04-24 NOTE — ED TRIAGE NOTES
Triage:  Pt to ED by referral of [facility] due to injury to right hand. Pt states right hand was slammed in a truck door. Pt states incident occurred yesterday and was seen today at [facility]. Pt arrives with small lac in between 4/5 digit also noted swelling and a hematoma to middle finger nail bed. Pt also has dried blood around the middle nail bed. Pt states pain to hand with noted increased pain to the middle finger.   Pt arrives with xray on disk from [facility].

## 2018-04-27 ENCOUNTER — TELEPHONE (OUTPATIENT)
Dept: FAMILY MEDICINE CLINIC | Age: 33
End: 2018-04-27

## 2018-04-27 NOTE — TELEPHONE ENCOUNTER
----- Message from Khanh Pearl sent at 4/26/2018  5:04 PM EDT -----  Regarding: NP Gerber/Telephone  Pt went to [facility] ER in regards to R hand locked in truck door. D/C papers stated, pt to schedule an appt by Tuesday 05/01/18. Inquiring if any other treatment will be needed. VOLODYMYR by timeframe.  Best contact number [phone]

## 2018-08-16 ENCOUNTER — OFFICE VISIT (OUTPATIENT)
Dept: FAMILY MEDICINE CLINIC | Age: 33
End: 2018-08-16

## 2018-08-16 VITALS
TEMPERATURE: 98.2 F | RESPIRATION RATE: 18 BRPM | HEIGHT: 73 IN | WEIGHT: 155 LBS | OXYGEN SATURATION: 98 % | BODY MASS INDEX: 20.54 KG/M2 | HEART RATE: 63 BPM | SYSTOLIC BLOOD PRESSURE: 112 MMHG | DIASTOLIC BLOOD PRESSURE: 76 MMHG

## 2018-08-16 DIAGNOSIS — S99.922A FOOT INJURY, LEFT, INITIAL ENCOUNTER: ICD-10-CM

## 2018-08-16 DIAGNOSIS — F10.20 ALCOHOLISM (HCC): ICD-10-CM

## 2018-08-16 DIAGNOSIS — K29.20 CHRONIC ALCOHOLIC GASTRITIS WITHOUT HEMORRHAGE: Primary | ICD-10-CM

## 2018-08-16 RX ORDER — BISMUTH SUBSALICYLATE 262 MG
1 TABLET,CHEWABLE ORAL DAILY
COMMUNITY
End: 2020-07-23 | Stop reason: ALTCHOICE

## 2018-08-16 RX ORDER — DEXLANSOPRAZOLE 60 MG/1
CAPSULE, DELAYED RELEASE ORAL
Qty: 30 CAP | Refills: 5 | Status: SHIPPED | OUTPATIENT
Start: 2018-08-16 | End: 2019-09-09 | Stop reason: SDUPTHER

## 2018-08-16 NOTE — PROGRESS NOTES
Chief Complaint   Patient presents with    Medication Evaluation     Dexilant 60mg     1. Have you been to the ER, urgent care clinic since your last visit? Hospitalized since your last visit? No    2. Have you seen or consulted any other health care providers outside of the [address] [place] [address] since your last visit? Include any pap smears or colon screening.  No

## 2018-12-17 ENCOUNTER — OFFICE VISIT (OUTPATIENT)
Dept: FAMILY MEDICINE CLINIC | Age: 33
End: 2018-12-17

## 2018-12-17 VITALS
BODY MASS INDEX: 20.94 KG/M2 | HEART RATE: 81 BPM | DIASTOLIC BLOOD PRESSURE: 72 MMHG | RESPIRATION RATE: 16 BRPM | OXYGEN SATURATION: 98 % | TEMPERATURE: 98 F | WEIGHT: 158 LBS | HEIGHT: 73 IN | SYSTOLIC BLOOD PRESSURE: 112 MMHG

## 2018-12-17 DIAGNOSIS — F43.23 ADJUSTMENT DISORDER WITH MIXED ANXIETY AND DEPRESSED MOOD: ICD-10-CM

## 2018-12-17 DIAGNOSIS — K29.20 CHRONIC ALCOHOLIC GASTRITIS WITHOUT HEMORRHAGE: ICD-10-CM

## 2018-12-17 DIAGNOSIS — F10.20 ALCOHOLISM (HCC): Primary | ICD-10-CM

## 2018-12-17 DIAGNOSIS — R41.3 MEMORY IMPAIRMENT: ICD-10-CM

## 2018-12-17 DIAGNOSIS — R09.81 SINUS CONGESTION: ICD-10-CM

## 2018-12-17 RX ORDER — FLUOXETINE HYDROCHLORIDE 20 MG/1
CAPSULE ORAL
COMMUNITY
Start: 2018-12-05 | End: 2019-01-07 | Stop reason: SDUPTHER

## 2018-12-17 RX ORDER — DEXLANSOPRAZOLE 30 MG/1
CAPSULE, DELAYED RELEASE ORAL
COMMUNITY
End: 2018-12-17 | Stop reason: SDUPTHER

## 2018-12-17 RX ORDER — HYDROXYZINE PAMOATE 50 MG/1
50 CAPSULE ORAL
COMMUNITY
Start: 2018-12-05 | End: 2019-01-07 | Stop reason: SDUPTHER

## 2018-12-17 RX ORDER — DICLOFENAC SODIUM 50 MG/1
TABLET, DELAYED RELEASE ORAL
Refills: 1 | COMMUNITY
Start: 2018-09-30 | End: 2018-12-17 | Stop reason: ALTCHOICE

## 2018-12-17 RX ORDER — CITALOPRAM 20 MG/1
TABLET, FILM COATED ORAL
Refills: 1 | COMMUNITY
Start: 2018-09-30 | End: 2018-12-17 | Stop reason: ALTCHOICE

## 2018-12-17 RX ORDER — TRAZODONE HYDROCHLORIDE 50 MG/1
50 TABLET ORAL
COMMUNITY
Start: 2018-12-05 | End: 2019-01-07 | Stop reason: SDUPTHER

## 2018-12-17 RX ORDER — DOXEPIN HYDROCHLORIDE 150 MG/1
CAPSULE ORAL
COMMUNITY
Start: 2018-10-16 | End: 2018-12-17 | Stop reason: ALTCHOICE

## 2018-12-17 RX ORDER — PANTOPRAZOLE SODIUM 40 MG/1
TABLET, DELAYED RELEASE ORAL
COMMUNITY
Start: 2018-11-12 | End: 2018-12-17 | Stop reason: ALTCHOICE

## 2018-12-17 NOTE — PROGRESS NOTES
HISTORY OF PRESENT ILLNESS  Stanley Zacarias is a 29 y.o. male. HPI  Follow up alcoholism. Reports he completed rehab stint in [place] with success. He then moved to [place] to be with his brother and dad who are recovered alcoholics. He relapsed and was put in an inpatient treatment center. He was diagnosed with depression/anxiety and went through another rehab. Reports he has not had alcohol in 4 days. He has been started on prozac and referred to psych. He has upcoming appointment tomorrow. Reports he is feeling improved on the prozac. Also taking trazodone and atarax to help with sleep. Reports he has been sleeping better with meds, having less racing thoughts. Alcoholic gastritis. Taking dexilant daily. C/o irritation and nasal congestion today after crushing a voltaren pill and inhaling it for back pain. Also reports some issues with memory impairment over the past few years. Past Medical History:   Diagnosis Date    Dystonia     Gastric ulcer     Pancreatitis     alcohol induced     Patient Active Problem List   Diagnosis Code    Elevated liver enzymes R74.8    Chronic alcoholic gastritis without hemorrhage K29.20    Alcoholism ([facility] [address] F10.20     Current Outpatient Medications   Medication Sig    FLUoxetine (PROZAC) 20 mg capsule     hydrOXYzine pamoate (VISTARIL) 50 mg capsule Take 50 mg by mouth nightly.  traZODone (DESYREL) 50 mg tablet Take 50 mg by mouth nightly.  multivitamin (ONE A DAY) tablet Take 1 Tab by mouth daily.  Dexlansoprazole (DEXILANT) 60 mg CpDB TAKE 1 CAPSULE BY MOUTH EVERY DAY.  acetaminophen (TYLENOL) 325 mg tablet Take  by mouth every four (4) hours as needed for Pain. No current facility-administered medications for this visit. Social History     Tobacco Use    Smoking status: Current Some Day Smoker    Smokeless tobacco: Current User    Tobacco comment: and chews tobacc0   Substance Use Topics    Alcohol use:  Yes     Alcohol/week: 50.4 oz     Types: 84 Cans of beer per week    Drug use: No     Visit Vitals  /72 (BP 1 Location: Left arm, BP Patient Position: Sitting)   Pulse 81   Temp 98 °F (36.7 °C) (Oral)   Resp 16   Ht 6' 1\" (1.854 m)   Wt 158 lb (71.7 kg)   SpO2 98%   BMI 20.85 kg/m²     Review of Systems   Constitutional: Negative. HENT: Positive for congestion and sore throat. Negative for ear pain. Respiratory: Negative for cough and shortness of breath. Cardiovascular: Negative for chest pain, palpitations and leg swelling. Psychiatric/Behavioral: Positive for depression (stable), memory loss and substance abuse. Negative for hallucinations and suicidal ideas. The patient is nervous/anxious (stable) and has insomnia (improved). All other systems reviewed and are negative. Physical Exam   Constitutional: He is oriented to person, place, and time. He appears well-developed and well-nourished. No distress. HENT:   Right Ear: Tympanic membrane and ear canal normal.   Left Ear: Tympanic membrane and ear canal normal.   Nose: Mucosal edema present. Right sinus exhibits no maxillary sinus tenderness and no frontal sinus tenderness. Left sinus exhibits no maxillary sinus tenderness and no frontal sinus tenderness. Mouth/Throat: Oropharynx is clear and moist.   Neck: Neck supple. Cardiovascular: Normal rate, regular rhythm and normal heart sounds. Pulmonary/Chest: Effort normal and breath sounds normal.   Abdominal: Soft. He exhibits no distension. There is no tenderness. There is no guarding. Musculoskeletal: Normal range of motion. He exhibits no edema. Lymphadenopathy:     He has no cervical adenopathy. Neurological: He is alert and oriented to person, place, and time. He has normal strength. No sensory deficit. Coordination normal.   Skin: Skin is warm and dry. Psychiatric: He has a normal mood and affect.  His behavior is normal. Thought content normal.       ASSESSMENT and PLAN  Diagnoses and all orders for this visit:    1. Alcoholism ([facility] [address]  S/p rehabilitation. Commended on abstinence. 2. Chronic alcoholic gastritis without hemorrhage  Stable on dexilant. 3. Adjustment disorder with mixed anxiety and depressed mood  Improved with prozac. Follow up as scheduled with psych. 4. Sinus congestion  May be secondary to inhalation of voltaren. Recommend avoiding any further inhalation of NSAID. Saline nasal spray as directed. 5. Memory impairment  Secondary to alcohol use disorder. Recommend continued abstinence. Continue prozac and trazodone. Follow up 3 months or sooner prn.

## 2018-12-17 NOTE — PROGRESS NOTES
Chief Complaint   Patient presents with    Other     Patient has concerns of memory issues/loss. Christian Arroyo :  [place] . Referral for more testing possibly for years of drinking concerned about damage in relation to memory     Nasal Congestion     1. Have you been to the ER, urgent care clinic since your last visit? Hospitalized since your last visit? Yes When: October 2018. Bing Sheppard, 10/2018    2. Have you seen or consulted any other health care providers outside of the [address] [place] [address] since your last visit? Include any pap smears or colon screening.  No

## 2019-01-07 ENCOUNTER — OFFICE VISIT (OUTPATIENT)
Dept: FAMILY MEDICINE CLINIC | Age: 34
End: 2019-01-07

## 2019-01-07 VITALS
TEMPERATURE: 97.9 F | BODY MASS INDEX: 20.81 KG/M2 | HEIGHT: 73 IN | RESPIRATION RATE: 18 BRPM | DIASTOLIC BLOOD PRESSURE: 81 MMHG | SYSTOLIC BLOOD PRESSURE: 140 MMHG | WEIGHT: 157 LBS | OXYGEN SATURATION: 97 % | HEART RATE: 65 BPM

## 2019-01-07 DIAGNOSIS — F43.23 ADJUSTMENT DISORDER WITH MIXED ANXIETY AND DEPRESSED MOOD: ICD-10-CM

## 2019-01-07 DIAGNOSIS — F10.20 ALCOHOLISM (HCC): ICD-10-CM

## 2019-01-07 DIAGNOSIS — G47.00 INSOMNIA, UNSPECIFIED TYPE: ICD-10-CM

## 2019-01-07 DIAGNOSIS — L30.9 DERMATITIS: Primary | ICD-10-CM

## 2019-01-07 RX ORDER — FLUOXETINE HYDROCHLORIDE 20 MG/1
20 CAPSULE ORAL DAILY
Qty: 30 CAP | Refills: 5 | Status: SHIPPED | OUTPATIENT
Start: 2019-01-07 | End: 2019-08-26 | Stop reason: ALTCHOICE

## 2019-01-07 RX ORDER — HYDROXYZINE PAMOATE 50 MG/1
50 CAPSULE ORAL
Qty: 30 CAP | Refills: 5 | Status: SHIPPED | OUTPATIENT
Start: 2019-01-07 | End: 2019-08-26 | Stop reason: ALTCHOICE

## 2019-01-07 RX ORDER — TRIAMCINOLONE ACETONIDE 1 MG/G
CREAM TOPICAL 2 TIMES DAILY
Qty: 45 G | Refills: 0 | Status: SHIPPED | OUTPATIENT
Start: 2019-01-07 | End: 2020-07-23 | Stop reason: ALTCHOICE

## 2019-01-07 RX ORDER — TRAZODONE HYDROCHLORIDE 50 MG/1
50 TABLET ORAL
Qty: 30 TAB | Refills: 5 | Status: SHIPPED | OUTPATIENT
Start: 2019-01-07 | End: 2019-08-26 | Stop reason: ALTCHOICE

## 2019-01-07 NOTE — PROGRESS NOTES
Chief Complaint   Patient presents with    Rash     bilateral arms and shoulders     Cold Symptoms     cough and congestion x1 week        1. Have you been to the ER, urgent care clinic since your last visit? Hospitalized since your last visit? Yes. Patient at [facility] ER 12/28 for hand fracture    2. Have you seen or consulted any other health care providers outside of the [facility] since your last visit? Include any pap smears or colon screening.  No

## 2019-01-07 NOTE — PROGRESS NOTES
HISTORY OF PRESENT ILLNESS  Caryl Cardoso is a 29 y.o. male. HPI  C/o itchy rash on bilateral upper arms over the past few weeks. Denies any recent unusual exposures or change in hygiene products. Follow up health issues. Anxiety/depression. Diagnosed last month at rehab center in [place]. Taking prozac with good sx control. Takes vistaril at bedtime to control racing thoughts with good effect. Chronic insomnia. Taking trazodone with vistaril. Reports he is sleeping better. Alcoholism. Had small relapse when he recently put his dog to sleep. Has not had any further relapse. Past Medical History:   Diagnosis Date    Dystonia     Gastric ulcer     Pancreatitis     alcohol induced     Patient Active Problem List   Diagnosis Code    Elevated liver enzymes R74.8    Chronic alcoholic gastritis without hemorrhage K29.20    Alcoholism ([facility] [address] F10.20     Current Outpatient Medications   Medication Sig    FLUoxetine (PROZAC) 20 mg capsule Take 1 Cap by mouth daily.  hydrOXYzine pamoate (VISTARIL) 50 mg capsule Take 1 Cap by mouth nightly.  traZODone (DESYREL) 50 mg tablet Take 1 Tab by mouth nightly.  triamcinolone acetonide (KENALOG) 0.1 % topical cream Apply  to affected area two (2) times a day. use thin layer    multivitamin (ONE A DAY) tablet Take 1 Tab by mouth daily.  Dexlansoprazole (DEXILANT) 60 mg CpDB TAKE 1 CAPSULE BY MOUTH EVERY DAY.  acetaminophen (TYLENOL) 325 mg tablet Take  by mouth every four (4) hours as needed for Pain. No current facility-administered medications for this visit. Social History     Tobacco Use    Smoking status: Current Some Day Smoker    Smokeless tobacco: Current User    Tobacco comment: and chews tobacc0   Substance Use Topics    Alcohol use:  Yes     Alcohol/week: 50.4 oz     Types: 84 Cans of beer per week    Drug use: No     Visit Vitals  /81 (BP 1 Location: Right arm, BP Patient Position: Sitting)   Pulse 65   Temp 97.9 °F (36.6 °C) (Oral)   Resp 18   Ht 6' 1\" (1.854 m)   Wt 157 lb (71.2 kg)   SpO2 97%   BMI 20.71 kg/m²       Review of Systems   Constitutional: Negative for chills and fever. Respiratory: Negative for cough and shortness of breath. Cardiovascular: Negative for chest pain, palpitations and leg swelling. Skin: Positive for itching and rash. Psychiatric/Behavioral: Positive for substance abuse (improved). Negative for depression, hallucinations and suicidal ideas. The patient is nervous/anxious (mild, intermittent) and has insomnia (improved). All other systems reviewed and are negative. Physical Exam   Constitutional: He appears well-developed and well-nourished. No distress. Neck: Neck supple. Cardiovascular: Normal rate, regular rhythm and normal heart sounds. Pulmonary/Chest: Effort normal and breath sounds normal.   Abdominal: Soft. He exhibits no distension. There is no tenderness. There is no guarding. Musculoskeletal: He exhibits no edema. Lymphadenopathy:     He has no cervical adenopathy. Neurological: He is alert. Coordination normal.   Skin:   Erythematous scaly patch bilateral upper arms. Psychiatric: He has a normal mood and affect. His behavior is normal. Thought content normal.       ASSESSMENT and PLAN  Diagnoses and all orders for this visit:    1. Dermatitis  -     triamcinolone acetonide (KENALOG) 0.1 % topical cream; Apply  to affected area two (2) times a day. use thin layer    2. Alcoholism ([address]  Had minor relapse, abstinence resumed. 3. Adjustment disorder with mixed anxiety and depressed mood  -     FLUoxetine (PROZAC) 20 mg capsule; Take 1 Cap by mouth daily. -     hydrOXYzine pamoate (VISTARIL) 50 mg capsule; Take 1 Cap by mouth nightly. Stable on current treatment. Continue current treatment. 4. Insomnia, unspecified type  -     traZODone (DESYREL) 50 mg tablet; Take 1 Tab by mouth nightly. Improved on trazodone.   Medication risks, benefits and potential side effects were reviewed. Follow up 4 months or sooner prn.

## 2019-05-30 ENCOUNTER — OFFICE VISIT (OUTPATIENT)
Dept: FAMILY MEDICINE CLINIC | Age: 34
End: 2019-05-30

## 2019-05-30 VITALS
SYSTOLIC BLOOD PRESSURE: 124 MMHG | HEIGHT: 73 IN | BODY MASS INDEX: 21.05 KG/M2 | DIASTOLIC BLOOD PRESSURE: 83 MMHG | WEIGHT: 158.8 LBS | HEART RATE: 68 BPM | OXYGEN SATURATION: 98 % | RESPIRATION RATE: 18 BRPM | TEMPERATURE: 97.7 F

## 2019-05-30 DIAGNOSIS — F10.20 ALCOHOLISM (HCC): ICD-10-CM

## 2019-05-30 DIAGNOSIS — G47.00 INSOMNIA, UNSPECIFIED TYPE: ICD-10-CM

## 2019-05-30 DIAGNOSIS — F41.9 ANXIETY AND DEPRESSION: ICD-10-CM

## 2019-05-30 DIAGNOSIS — K29.20 CHRONIC ALCOHOLIC GASTRITIS WITHOUT HEMORRHAGE: Primary | ICD-10-CM

## 2019-05-30 DIAGNOSIS — D72.818 OTHER DECREASED WHITE BLOOD CELL (WBC) COUNT: ICD-10-CM

## 2019-05-30 DIAGNOSIS — F32.A ANXIETY AND DEPRESSION: ICD-10-CM

## 2019-05-30 PROBLEM — D72.819 LEUCOPENIA: Status: ACTIVE | Noted: 2019-05-30

## 2019-05-30 NOTE — PROGRESS NOTES
Chief Complaint   Patient presents with    Check Up     pt stated \"its been a while so just a check up\"     1. Have you been to the ER, urgent care clinic since your last visit? Hospitalized since your last visit? Yes, Mikayla Dickinson- (pt unaware of date) he stated he cut his leg open with a chain saw and received stitches   2. Have you seen or consulted any other health care providers outside of the [address] [place] [address] since your last visit? Include any pap smears or colon screening.  No      Pt stated that there was no specific concerns he wanted to address

## 2019-05-30 NOTE — PROGRESS NOTES
HISTORY OF PRESENT ILLNESS  Roxanne Lopez is a 29 y.o. male. HPI  Follow up health problems. Alcoholism. Currently living in group home with [facility] for addiction. Reports he has been sober for 52 days. Had episode of binge relapse last month, arrested for DUI. Wears an ankle monitor for alcohol detection. Has court hearing in a few months. Chronic alcohol related gastritis. Taking dexilant daily with good symptom control. Anxiety/depression. Taking prozac and atarax with good sx control. Chronic insomnia. Taking trazodone nightly. WBC count was decreased with last labs done 1 year ago. Asymptomatic. Past Medical History:   Diagnosis Date    Dystonia     Gastric ulcer     Pancreatitis     alcohol induced     Patient Active Problem List   Diagnosis Code    Elevated liver enzymes R74.8    Chronic alcoholic gastritis without hemorrhage K29.20    Alcoholism ([facility] [address] F10.20    Insomnia G47.00    Leucopenia D72.819    Anxiety and depression F41.9, F32.9     Current Outpatient Medications   Medication Sig    FLUoxetine (PROZAC) 20 mg capsule Take 1 Cap by mouth daily.  hydrOXYzine pamoate (VISTARIL) 50 mg capsule Take 1 Cap by mouth nightly.  traZODone (DESYREL) 50 mg tablet Take 1 Tab by mouth nightly.  triamcinolone acetonide (KENALOG) 0.1 % topical cream Apply  to affected area two (2) times a day. use thin layer    multivitamin (ONE A DAY) tablet Take 1 Tab by mouth daily.  Dexlansoprazole (DEXILANT) 60 mg CpDB TAKE 1 CAPSULE BY MOUTH EVERY DAY.  acetaminophen (TYLENOL) 325 mg tablet Take  by mouth every four (4) hours as needed for Pain. No current facility-administered medications for this visit. Social History     Tobacco Use    Smoking status: Current Some Day Smoker     Packs/day: 1.00    Smokeless tobacco: Current User    Tobacco comment: uses chews    Substance Use Topics    Alcohol use:  Yes     Alcohol/week: 50.4 oz     Types: 84 Cans of beer per week    Drug use: No     Visit Vitals  /83 (BP 1 Location: Right arm, BP Patient Position: Sitting)   Pulse 68   Temp 97.7 °F (36.5 °C) (Oral)   Resp 18   Ht 6' 1\" (1.854 m)   Wt 158 lb 12.8 oz (72 kg)   SpO2 98%   BMI 20.95 kg/m²     Review of Systems   Constitutional: Negative. Respiratory: Negative for cough and shortness of breath. Cardiovascular: Negative for chest pain, palpitations and leg swelling. Gastrointestinal: Positive for heartburn. Negative for abdominal pain, blood in stool, nausea and vomiting. Psychiatric/Behavioral: Negative for depression and suicidal ideas. The patient is nervous/anxious (intermittent). All other systems reviewed and are negative. Physical Exam   Constitutional: No distress. Neck: Neck supple. Cardiovascular: Normal rate, regular rhythm and normal heart sounds. Pulmonary/Chest: Effort normal and breath sounds normal.   Abdominal: Soft. He exhibits no distension. There is no tenderness. There is no guarding. Musculoskeletal: Normal range of motion. He exhibits no edema. Lymphadenopathy:     He has no cervical adenopathy. Neurological: He is alert. Coordination normal.   Skin: Skin is warm and dry. Psychiatric: His behavior is normal. Thought content normal.   Flat affect. ASSESSMENT and PLAN  Diagnoses and all orders for this visit:    1. Chronic alcoholic gastritis without hemorrhage  Controlled on dexilant. 2. Alcoholism ([facility] [address]  -     METABOLIC PANEL, BASIC  -     HEPATIC FUNCTION PANEL  -     TX HANDLG&/OR CONVEY OF SPEC FOR TR OFFICE TO LAB  -     COLLECTION VENOUS BLOOD,VENIPUNCTURE  Transient LFT elevation in the past.  Recheck labs. Encouraged to continue [facility].  Avoid group of friends that encourage excessive alcohol intake. 3. Other decreased white blood cell (WBC) count  -     CBC W/O DIFF    4. Anxiety and depression  Stable on prozac and atarax.     5. Insomnia, unspecified type  Stable on trazodone. Follow up 6 months or sooner prn.

## 2019-05-31 LAB
ALBUMIN SERPL-MCNC: 4.3 G/DL (ref 3.5–5.5)
ALP SERPL-CCNC: 60 IU/L (ref 39–117)
ALT SERPL-CCNC: 7 IU/L (ref 0–44)
AST SERPL-CCNC: 11 IU/L (ref 0–40)
BILIRUB DIRECT SERPL-MCNC: 0.25 MG/DL (ref 0–0.4)
BILIRUB SERPL-MCNC: 1.4 MG/DL (ref 0–1.2)
BUN SERPL-MCNC: 6 MG/DL (ref 6–20)
BUN/CREAT SERPL: 8 (ref 9–20)
CALCIUM SERPL-MCNC: 9.5 MG/DL (ref 8.7–10.2)
CHLORIDE SERPL-SCNC: 103 MMOL/L (ref 96–106)
CO2 SERPL-SCNC: 28 MMOL/L (ref 20–29)
CREAT SERPL-MCNC: 0.72 MG/DL (ref 0.76–1.27)
ERYTHROCYTE [DISTWIDTH] IN BLOOD BY AUTOMATED COUNT: 12.6 % (ref 12.3–15.4)
GLUCOSE SERPL-MCNC: 81 MG/DL (ref 65–99)
HCT VFR BLD AUTO: 42.4 % (ref 37.5–51)
HGB BLD-MCNC: 14.3 G/DL (ref 13–17.7)
MCH RBC QN AUTO: 30.6 PG (ref 26.6–33)
MCHC RBC AUTO-ENTMCNC: 33.7 G/DL (ref 31.5–35.7)
MCV RBC AUTO: 91 FL (ref 79–97)
PLATELET # BLD AUTO: 256 X10E3/UL (ref 150–450)
POTASSIUM SERPL-SCNC: 3.9 MMOL/L (ref 3.5–5.2)
PROT SERPL-MCNC: 6.5 G/DL (ref 6–8.5)
RBC # BLD AUTO: 4.68 X10E6/UL (ref 4.14–5.8)
SODIUM SERPL-SCNC: 145 MMOL/L (ref 134–144)
WBC # BLD AUTO: 3.8 X10E3/UL (ref 3.4–10.8)

## 2019-08-01 ENCOUNTER — OFFICE VISIT (OUTPATIENT)
Dept: FAMILY MEDICINE CLINIC | Age: 34
End: 2019-08-01

## 2019-08-01 VITALS
HEIGHT: 73 IN | OXYGEN SATURATION: 97 % | SYSTOLIC BLOOD PRESSURE: 118 MMHG | BODY MASS INDEX: 21.34 KG/M2 | DIASTOLIC BLOOD PRESSURE: 76 MMHG | WEIGHT: 161 LBS | RESPIRATION RATE: 16 BRPM | HEART RATE: 87 BPM | TEMPERATURE: 97.8 F

## 2019-08-01 DIAGNOSIS — F32.A ANXIETY AND DEPRESSION: ICD-10-CM

## 2019-08-01 DIAGNOSIS — F41.9 ANXIETY AND DEPRESSION: ICD-10-CM

## 2019-08-01 DIAGNOSIS — K29.20 CHRONIC ALCOHOLIC GASTRITIS WITHOUT HEMORRHAGE: Primary | ICD-10-CM

## 2019-08-01 DIAGNOSIS — G47.00 INSOMNIA, UNSPECIFIED TYPE: ICD-10-CM

## 2019-08-01 DIAGNOSIS — F10.20 ALCOHOLISM (HCC): ICD-10-CM

## 2019-08-01 DIAGNOSIS — M25.562 ACUTE PAIN OF LEFT KNEE: ICD-10-CM

## 2019-08-01 PROBLEM — R74.8 ELEVATED LIVER ENZYMES: Status: RESOLVED | Noted: 2017-09-15 | Resolved: 2019-08-01

## 2019-08-01 PROBLEM — D72.819 LEUCOPENIA: Status: RESOLVED | Noted: 2019-05-30 | Resolved: 2019-08-01

## 2019-08-01 NOTE — PROGRESS NOTES
HISTORY OF PRESENT ILLNESS  Alfredo Fermin is a 29 y.o. male. HPI  Routine follow up OV for chronic health problems. Chronic alcohol gastritis. Taking dexilant with good symptom control. Alcoholism. Has been sober now for 3 months. Lives in group home and attends sobriety meetings every day. Anxiety/depression. Was taking prozac and atarax. Stopped meds about 2 months ago, wanted to see how he felt off meds. Reports feeling okay. Has scheduled appointment with specialist at [facility] for addiction. Insomnia. Was taking trazodone. Stopped med 2 months ago. Has occasional insomnia. Smoker. Cut back on cigarettes. Smoking about 1/4 ppd. Uses chewing tobacco at times. Thinks he may have tweaked something in his left knee. Having some medial discomfort with full flexion. Past Medical History:   Diagnosis Date    [facility]     Dystonia     Elevated liver enzymes 9/15/2017    Gastric ulcer     Leucopenia 5/30/2019    Pancreatitis     alcohol induced     Patient Active Problem List   Diagnosis Code    Chronic alcoholic gastritis without hemorrhage K29.20    [facility]ism ([facility] F10.20    Insomnia G47.00    Anxiety and depression F41.9, F32.9     Current Outpatient Medications   Medication Sig    Dexlansoprazole (DEXILANT) 60 mg CpDB TAKE 1 CAPSULE BY MOUTH EVERY DAY.  FLUoxetine (PROZAC) 20 mg capsule Take 1 Cap by mouth daily.  hydrOXYzine pamoate (VISTARIL) 50 mg capsule Take 1 Cap by mouth nightly.  traZODone (DESYREL) 50 mg tablet Take 1 Tab by mouth nightly.  triamcinolone acetonide (KENALOG) 0.1 % topical cream Apply  to affected area two (2) times a day. use thin layer    multivitamin (ONE A DAY) tablet Take 1 Tab by mouth daily.  acetaminophen (TYLENOL) 325 mg tablet Take  by mouth every four (4) hours as needed for Pain. No current facility-administered medications for this visit.       Social History     Tobacco Use    Smoking status: Current Some Day Smoker     Packs/day: 1.00    Smokeless tobacco: Current User     Types: Snuff    Tobacco comment: uses chews    Substance Use Topics    Alcohol use: Not Currently     Alcohol/week: 84.0 standard drinks     Types: 84 Cans of beer per week     Comment: 3 months sober 8/1/19    Drug use: No     Blood pressure 118/76, pulse 87, temperature 97.8 °F (36.6 °C), temperature source Oral, resp. rate 16, height 6' 1\" (1.854 m), weight 161 lb (73 kg), SpO2 97 %. Review of Systems   Constitutional: Negative. Respiratory: Negative for cough and shortness of breath. Cardiovascular: Negative for chest pain, palpitations and leg swelling. Gastrointestinal: Negative for abdominal pain, blood in stool, melena, nausea and vomiting. Musculoskeletal: Positive for joint pain (left knee). Psychiatric/Behavioral: Negative for depression, hallucinations, substance abuse and suicidal ideas. The patient is not nervous/anxious and does not have insomnia. Physical Exam   Constitutional: No distress. Neck: Neck supple. Cardiovascular: Normal rate, regular rhythm and normal heart sounds. Pulmonary/Chest: Effort normal and breath sounds normal.   Abdominal: Soft. He exhibits no distension. There is no tenderness. There is no guarding. Musculoskeletal: Normal range of motion. Left knee: He exhibits LCL laxity. He exhibits normal range of motion, no swelling, no effusion, no bony tenderness and no MCL laxity. Tenderness found. Medial joint line tenderness noted. Lymphadenopathy:     He has no cervical adenopathy. ASSESSMENT and PLAN  Diagnoses and all orders for this visit:    1. Chronic alcoholic gastritis without hemorrhage  Stable on dexilant and alcohol abstinence. 2. Alcoholism ([address]  Commended on abstinence. Continue to reside in group home rehab facility. 3. Insomnia, unspecified type  Stable. 4. Anxiety and depression  Stable off meds. Follow up with psych as scheduled.     5. Acute pain of left knee  Mild sprain. Avoid high impact activities. Recommend topical analgesia such as salon pas with lidocaine, biofreeze or arnicare gel as directed. Follow up 6 months or sooner prn.

## 2019-08-01 NOTE — PROGRESS NOTES
Chief Complaint   Patient presents with    Anxiety     follow up     Alcohol Problem     sober for 3 months    Knee Pain     left knee radiating to lower      1. Have you been to the ER, urgent care clinic since your last visit? Hospitalized since your last visit? No    2. Have you seen or consulted any other health care providers outside of the [address] [place] [address] since your last visit? Include any pap smears or colon screening.  No

## 2019-08-26 ENCOUNTER — OFFICE VISIT (OUTPATIENT)
Dept: FAMILY MEDICINE CLINIC | Age: 34
End: 2019-08-26

## 2019-08-26 ENCOUNTER — HOSPITAL ENCOUNTER (OUTPATIENT)
Dept: GENERAL RADIOLOGY | Age: 34
Discharge: HOME | End: 2019-08-26
Payer: COMMERCIAL

## 2019-08-26 VITALS
HEART RATE: 75 BPM | DIASTOLIC BLOOD PRESSURE: 72 MMHG | OXYGEN SATURATION: 98 % | WEIGHT: 160 LBS | BODY MASS INDEX: 21.2 KG/M2 | RESPIRATION RATE: 16 BRPM | TEMPERATURE: 97.4 F | SYSTOLIC BLOOD PRESSURE: 120 MMHG | HEIGHT: 73 IN

## 2019-08-26 DIAGNOSIS — M25.562 ACUTE PAIN OF LEFT KNEE: ICD-10-CM

## 2019-08-26 DIAGNOSIS — M25.562 ACUTE PAIN OF LEFT KNEE: Primary | ICD-10-CM

## 2019-08-26 PROCEDURE — 73562 X-RAY EXAM OF KNEE 3: CPT

## 2019-08-26 RX ORDER — MELOXICAM 15 MG/1
15 TABLET ORAL DAILY
Qty: 30 TAB | Refills: 0 | Status: SHIPPED | OUTPATIENT
Start: 2019-08-26 | End: 2020-07-23 | Stop reason: ALTCHOICE

## 2019-08-26 NOTE — PROGRESS NOTES
HISTORY OF PRESENT ILLNESS  Nolan Hassan is a 29 y.o. male. HPI  C/o pain in left knee for a few weeks. Aggravated by standing after sitting awhile with flexed knee. Reports he had an injury to the mediall side of his knee several months ago when he got cut with a chain saw. C/o pain in the posterolateral area. Denies swelling, locking or giving out. No pain with ascending/descending stairs. Past medical history, social history, family history and medications were reviewed and updated. Blood pressure 120/72, pulse 75, temperature 97.4 °F (36.3 °C), temperature source Oral, resp. rate 16, height 6' 1\" (1.854 m), weight 160 lb (72.6 kg), SpO2 98 %. Review of Systems   Musculoskeletal: Positive for joint pain (left knee). All other systems reviewed and are negative. Physical Exam   Constitutional: No distress. Neck: Neck supple. Cardiovascular: Normal rate, regular rhythm and normal heart sounds. Pulmonary/Chest: Effort normal and breath sounds normal.   Musculoskeletal:        Left knee: He exhibits normal range of motion, no swelling, no LCL laxity, normal patellar mobility and no MCL laxity. Tenderness found. Lateral joint line (posterolateral) tenderness noted. No patellar tendon tenderness noted. Lymphadenopathy:     He has no cervical adenopathy. Skin: Skin is warm and dry. ASSESSMENT and PLAN  Diagnoses and all orders for this visit:    1. Acute pain of left knee  -     XR KNEE LT 3 V; Future  -     meloxicam (MOBIC) 15 mg tablet; Take 1 Tab by mouth daily. Knee xray with mild effusion. No degenerative abnormality. Probable ligament strain. Trial mobic, ice, topical analgesia such as biofreeze as directed. Follow up prn if sx worsen or FTI.

## 2019-08-26 NOTE — PROGRESS NOTES
Chief Complaint   Patient presents with    Knee Pain     left knee pain, experiencing pain for several weeks      1. Have you been to the ER, urgent care clinic since your last visit? Hospitalized since your last visit? No    2. Have you seen or consulted any other health care providers outside of the [address] [place] [address] since your last visit? Include any pap smears or colon screening.  No

## 2019-09-09 DIAGNOSIS — K29.20 CHRONIC ALCOHOLIC GASTRITIS WITHOUT HEMORRHAGE: ICD-10-CM

## 2019-09-09 RX ORDER — DEXLANSOPRAZOLE 60 MG/1
CAPSULE, DELAYED RELEASE ORAL
Qty: 30 CAP | Refills: 5 | Status: SHIPPED | OUTPATIENT
Start: 2019-09-09 | End: 2020-07-15 | Stop reason: SDUPTHER

## 2019-09-09 NOTE — TELEPHONE ENCOUNTER
Pharmacy is requesting medication refill   Requested Prescriptions     Pending Prescriptions Disp Refills    dexlansoprazole (DEXILANT) 60 mg CpDB capsule (delayed release) 30 Cap 5     Sig: TAKE 1 CAPSULE BY MOUTH EVERY DAY.        Pharmacy on file verified  [facility] [phone])

## 2019-09-09 NOTE — TELEPHONE ENCOUNTER
PCP: Anthony Clements NP    Last appt: 8/26/2019  No future appointments. Requested Prescriptions     Pending Prescriptions Disp Refills    dexlansoprazole (DEXILANT) 60 mg CpDB capsule (delayed release) 30 Cap 5     Sig: TAKE 1 CAPSULE BY MOUTH EVERY DAY.

## 2019-12-03 ENCOUNTER — TELEPHONE (OUTPATIENT)
Dept: FAMILY MEDICINE CLINIC | Age: 34
End: 2019-12-03

## 2019-12-03 NOTE — TELEPHONE ENCOUNTER
----- Message from [address] sent at 12/3/2019  1:57 PM EST -----  Regarding: NP Shirley/ Telephone  Caller's first and last name: Jeronimo Gosselin With [employer]    Reason for call: Called about fax needing to be signed by DR. Ambrose required yes/no and why: yes  Best contact number(s): [phone]  Details to clarify the request: n/a

## 2019-12-05 NOTE — TELEPHONE ENCOUNTER
----- Message from [employer] sent at 12/5/2019 12:01 PM EST -----  Regarding: [place]/telephone  Tobi with [employer] is requesting to if you have received the form for a knee and back brace he faxed on 11/27/19. Irma Ware number is [phone].

## 2019-12-09 NOTE — TELEPHONE ENCOUNTER
Lavetta Babinski from [employer] is calling stating the last two forms are missing that were suppose to be faxed over for the patient left/right knee. Lavetta Babinski stated they were supposed to be a total of four pages and only received two. Lavetta Babinski is requesting for those to be faxed over.       TPE#[phone]          [phone]      Best callback:[phone]     LOV: Monday, August 26, 2019

## 2020-01-13 NOTE — DISCHARGE SUMMARY
Discharge Summary       PATIENT ID: Francis Celeste  MRN: [id number]   YOB: 1985    DATE OF ADMISSION: 9/13/2017 11:43 AM    DATE OF DISCHARGE: 9/15/2017   PRIMARY CARE PROVIDER: Akiko Sanchez NP     ATTENDING PHYSICIAN: Ashley Muller  DISCHARGING PROVIDER: Qi Brian NP    To contact this individual call [phone] and ask the  to page. If unavailable ask to be transferred the Adult Hospitalist Department. CONSULTATIONS: IP CONSULT TO HOSPITALIST    PROCEDURES/SURGERIES: * No surgery found *    ADMITTING DIAGNOSES & HOSPITAL COURSE:   Dx: Abdominal pain, Pancreatitis, etoh abuse, elevated liver enzymes    31 yom who presents with abdominal pain since this morning dull ache in nature mostly in upper abdomen non radiating. In brief  The pt had a BM one night ago and reports that he has not had flatulence since the onset of the episode. Food and drink did not worsen pain significantly.  The pt drinks 12-30 beers a day and his last drink was at 0200. This episode feels different and worse than his last gastric ulcer. pt has PMH of gastric ulcer and dystonia who presents from home with mother CT done in ER showing pancreatitis but lipase in < 1000 .     Lipase now wnl. Pain elijah than 2 out of 10. Patient tolerating Gi lite diet. Patient stable for discharge home.    DISCHARGE DIAGNOSES / PLAN:      Abdominal pain likely d/t acute on chronic pancreatitis   -CT abd with evidence of pancreatitis  -lipase up on admission now wnl  -tolerating diet   -Gi lite diet     ETOH abuse   -pt acknowledges problem however not ready to quit      Tobacco abuse  -chewing tobacco, cessation education provided      H/o PUD   -resume home PPI     Elevated liver enzymes   -on chart review, actually improved   -likely d/t to etoh abuse   -bili up today, recommending recheck with pcp CT showed no evidence of stones and pain improved, likely elevated d/t etoh abuse       PENDING TEST RESULTS:   At the time of discharge the following test results are still pending: none    FOLLOW UP APPOINTMENTS:    Follow-up Information     Follow up With Details Comments [address], NP In 1 week hospital follow up [address]:   1. Make sure to take your Dexilant as prescribed. 2. Follow up with your pcp within 1 week for hospital follow up. We would like you to have your liver enzymes rechecked at the follow up as some of the levels were elevated here. 3. Stop drinking alcohol. DIET: Gi lite    ACTIVITY: as tolerated    WOUND CARE: none    EQUIPMENT needed: none      DISCHARGE MEDICATIONS:  Current Discharge Medication List      CONTINUE these medications which have NOT CHANGED    Details   acetaminophen (TYLENOL) 325 mg tablet Take  by mouth every four (4) hours as needed for Pain. Dexlansoprazole (DEXILANT) 60 mg CpDB Take 1 Cap by mouth daily. Qty: 30 Cap, Refills: 5    Associated Diagnoses: Chronic alcoholic gastritis without hemorrhage               NOTIFY YOUR PHYSICIAN FOR ANY OF THE FOLLOWING:   Fever over 101 degrees for 24 hours. Chest pain, shortness of breath, fever, chills, nausea, vomiting, diarrhea, change in mentation, falling, weakness, bleeding. Severe pain or pain not relieved by medications. Or, any other signs or symptoms that you may have questions about. DISPOSITION:  X  Home With:   OT  PT  HH  RN       Long term SNF/Inpatient Rehab    Independent/assisted living    Hospice    Other:       PATIENT CONDITION AT DISCHARGE:     Functional status    Poor     Deconditioned    X Independent      Cognition    X Lucid     Forgetful     Dementia      Catheters/lines (plus indication)    Farley     PICC     PEG    X None      Code status   X  Full code     DNR      PHYSICAL EXAMINATION AT DISCHARGE:  General: No acute distress, cooperative, pleasant   EENT: EOMI. Anicteric sclerae. Oral mucous moist, oropharynx benign  Resp: CTA bilaterally. No wheezing/rhonchi/rales. No accessory muscle use  CV: Regular rhythm, normal rate, no murmurs, gallops, rubs  GI: Soft, non distended, non tender. normoactive bowel sounds,   Extremities: No edema, warm, 2+ pulses throughout  Neurologic: Moves all extremities. AAOx3,   Psych: Good insight. Not anxious nor agitated.   Skin: Good Turgor, no rashes or ulcers      CHRONIC MEDICAL DIAGNOSES:  Problem List as of 9/15/2017  Date Reviewed: 9/15/2017          Codes Class Noted - Resolved    Alcohol-induced acute pancreatitis ICD-10-CM: K85.20  ICD-9-CM: 577.0  9/15/2017 - Present        Elevated liver enzymes ICD-10-CM: R74.8  ICD-9-CM: 790.5  9/15/2017 - Present        * (Principal)Abdominal pain ICD-10-CM: R10.9  ICD-9-CM: 789.00  9/13/2017 - Present        Alcohol use disorder ([facility] ICD-10-CM: F10.99  ICD-9-CM: 305.00  3/20/2017 - Present        Gastritis ICD-10-CM: K29.70  ICD-9-CM: 535.50  2/29/2016 - Present        RESOLVED: Alcohol abuse ICD-10-CM: F10.10  ICD-9-CM: 305.00  2/29/2016 - 3/20/2017              Greater than 30 minutes were spent with the patient on counseling and coordination of care    Signed:   John Kemp NP  9/15/2017  10:22 AM Appropriate/Calm

## 2020-01-17 DIAGNOSIS — F43.23 ADJUSTMENT DISORDER WITH MIXED ANXIETY AND DEPRESSED MOOD: ICD-10-CM

## 2020-01-17 RX ORDER — HYDROXYZINE PAMOATE 50 MG/1
50 CAPSULE ORAL
Qty: 30 CAP | Refills: 0 | Status: SHIPPED | OUTPATIENT
Start: 2020-01-17 | End: 2021-11-08 | Stop reason: ALTCHOICE

## 2020-01-17 RX ORDER — FLUOXETINE HYDROCHLORIDE 20 MG/1
20 CAPSULE ORAL DAILY
Qty: 30 CAP | Refills: 0 | Status: SHIPPED | OUTPATIENT
Start: 2020-01-17 | End: 2020-07-23 | Stop reason: ALTCHOICE

## 2020-01-17 NOTE — TELEPHONE ENCOUNTER
PCP: Pamela Christian NP    Last appt: 8/26/2019  No future appointments. Requested Prescriptions     Pending Prescriptions Disp Refills    FLUoxetine (PROZAC) 20 mg capsule 30 Cap 5     Sig: Take 1 Cap by mouth daily.  hydrOXYzine pamoate (VISTARIL) 50 mg capsule 30 Cap 5     Sig: Take 1 Cap by mouth nightly.

## 2020-06-13 ENCOUNTER — APPOINTMENT (OUTPATIENT)
Dept: GENERAL RADIOLOGY | Age: 35
End: 2020-06-13
Attending: EMERGENCY MEDICINE
Payer: COMMERCIAL

## 2020-06-13 ENCOUNTER — HOSPITAL ENCOUNTER (EMERGENCY)
Age: 35
Discharge: HOME | End: 2020-06-14
Attending: EMERGENCY MEDICINE | Admitting: EMERGENCY MEDICINE
Payer: COMMERCIAL

## 2020-06-13 DIAGNOSIS — G89.29 CHRONIC LEFT SHOULDER PAIN: ICD-10-CM
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
## 2020-06-13 DIAGNOSIS — S92.345A CLOSED NONDISPLACED FRACTURE OF FOURTH METATARSAL BONE OF LEFT FOOT, INITIAL ENCOUNTER: Primary | ICD-10-CM

## 2020-06-13 DIAGNOSIS — M25.512 CHRONIC LEFT SHOULDER PAIN: ICD-10-CM

## 2020-06-13 PROCEDURE — 75810000053 HC SPLINT APPLICATION

## 2020-06-13 PROCEDURE — 99284 EMERGENCY DEPT VISIT MOD MDM: CPT

## 2020-06-13 PROCEDURE — 73030 X-RAY EXAM OF SHOULDER: CPT

## 2020-06-13 PROCEDURE — 73630 X-RAY EXAM OF FOOT: CPT

## 2020-06-14 VITALS
OXYGEN SATURATION: 97 % | HEART RATE: 105 BPM | RESPIRATION RATE: 16 BRPM | SYSTOLIC BLOOD PRESSURE: 150 MMHG | TEMPERATURE: 98.5 F | DIASTOLIC BLOOD PRESSURE: 90 MMHG

## 2020-06-14 PROCEDURE — 75810000053 HC SPLINT APPLICATION

## 2020-06-14 NOTE — ED NOTES
Discharge instructions given to pt by RN in teach back method and verbalizes understanding. Opportunity for questions provided. Pt ambulatory out of unit, in no acute distress and states he will walk home.

## 2020-06-14 NOTE — ED NOTES
Posterior short leg splint applied to LEFT leg. Neuro and vascular intact post splint application. Pt declined crutches, states \"I have some at home. \" pt reporting he does not have a ride home, does not have anybody to pick him up and willl walk home. Offered to call insurance for cab ride, pt declined. Pt insisting he will walk home. \"i'll be alright. \"

## 2020-06-14 NOTE — DISCHARGE INSTRUCTIONS
Patient Education        Preventing Falls: Care Instructions  Your Care Instructions     Getting around your home safely can be a challenge if you have injuries or health problems that make it easy for you to fall. Loose rugs and furniture in walkways are among the dangers for many older people who have problems walking or who have poor eyesight. People who have conditions such as arthritis, osteoporosis, or dementia also have to be careful not to fall. You can make your home safer with a few simple measures. Follow-up care is a key part of your treatment and safety. Be sure to make and go to all appointments, and call your doctor if you are having problems. It's also a good idea to know your test results and keep a list of the medicines you take. How can you care for yourself at home? Taking care of yourself  · You may get dizzy if you do not drink enough water. To prevent dehydration, drink plenty of fluids, enough so that your urine is light yellow or clear like water. Choose water and other caffeine-free clear liquids. If you have kidney, heart, or liver disease and have to limit fluids, talk with your doctor before you increase the amount of fluids you drink. · Exercise regularly to improve your strength, muscle tone, and balance. Walk if you can. Swimming may be a good choice if you cannot walk easily. · Have your vision and hearing checked each year or any time you notice a change. If you have trouble seeing and hearing, you might not be able to avoid objects and could lose your balance. · Know the side effects of the medicines you take. Ask your doctor or pharmacist whether the medicines you take can affect your balance. Sleeping pills or sedatives can affect your balance. · Limit the amount of alcohol you drink. Alcohol can impair your balance and other senses. · Ask your doctor whether calluses or corns on your feet need to be removed.  If you wear loose-fitting shoes because of calluses or corns, you can lose your balance and fall. · Talk to your doctor if you have numbness in your feet. Preventing falls at home  · Remove raised doorway thresholds, throw rugs, and clutter. Repair loose carpet or raised areas in the floor. · Move furniture and electrical cords to keep them out of walking paths. · Use nonskid floor wax, and wipe up spills right away, especially on ceramic tile floors. · If you use a walker or cane, put rubber tips on it. If you use crutches, clean the bottoms of them regularly with an abrasive pad, such as steel wool. · Keep your house well lit, especially [place], and outside walkways. Use night-lights in areas such as hallways and bathrooms. Add extra light switches or use remote switches (such as switches that go on or off when you clap your hands) to make it easier to turn lights on if you have to get up during the night. · Install sturdy handrails on stairways. · Move items in your cabinets so that the things you use a lot are on the lower shelves (about waist level). · Keep a cordless phone and a flashlight with new batteries by your bed. If possible, put a phone in each of the main rooms of your house, or carry a cell phone in case you fall and cannot reach a phone. Or, you can wear a device around your neck or wrist. You push a button that sends a signal for help. · Wear low-heeled shoes that fit well and give your feet good support. Use footwear with nonskid soles. Check the heels and soles of your shoes for wear. Repair or replace worn heels or soles. · Do not wear socks without shoes on wood floors. · Walk on the grass when the sidewalks are slippery. If you live in an area that gets snow and ice in the winter, sprinkle salt on slippery steps and sidewalks. Preventing falls in the bath  · Install grab bars and nonskid mats inside and outside your shower or tub and near the toilet and sinks. · Use shower chairs and bath benches.   · Use a hand-held shower head that will allow you to sit while showering. · Get into a tub or shower by putting the weaker leg in first. Get out of a tub or shower with your strong side first.  · Repair loose toilet seats and consider installing a raised toilet seat to make getting on and off the toilet easier. · Keep your bathroom door unlocked while you are in the shower. Where can you learn more? Go to http://darinel-nova.info/  Enter G117 in the search box to learn more about \"Preventing Falls: Care Instructions. \"  Current as of: August 7, 2019               Content Version: 12.5  © 0591-5327 [employer]. Care instructions adapted under license by [employer] (which disclaims liability or warranty for this information). If you have questions about a medical condition or this instruction, always ask your healthcare professional. [place] [address] any warranty or liability for your use of this information. Patient Education        Broken Foot: Care Instructions  Your Care Instructions     A broken foot, or foot fracture, is a break in one or more of the bones in your foot. It may happen because of a sports injury, a fall, or other accident. A compound, or open, fracture occurs when a bone breaks through the skin. A break that does not poke through the skin is a closed fracture. Your treatment depends on the location and type of break in your foot. You may need a splint, a cast, or an orthopedic shoe. Certain kinds of injuries may need surgery at some time. Whatever your treatment, you can ease symptoms and help your foot heal with care at home. You may need 6 to 8 weeks or more to fully heal.  You heal best when you take good care of yourself. Eat a variety of healthy foods, and don't smoke. Follow-up care is a key part of your treatment and safety. Be sure to make and go to all appointments, and call your doctor if you are having problems.  It's also a good idea to know your test results and keep a list of the medicines you take. How can you care for yourself at home? · Be safe with medicines. Take pain medicines exactly as directed. ? If the doctor gave you a prescription medicine for pain, take it as prescribed. ? If you are not taking a prescription pain medicine, ask your doctor if you can take an over-the-counter medicine. · Leave the splint on until your follow-up appointment. Do not put any weight on the injured foot. If you were given crutches, use them as directed. · Put ice or a cold pack on your foot for 10 to 20 minutes at a time. Try to do this every 1 to 2 hours for the next 3 days (when you are awake) or until the swelling goes down. Put a thin cloth between the ice and your skin. · Prop up the sore foot on a pillow anytime you sit or lie down during the next 3 days. Try to keep it above the level of your heart. This will help reduce swelling. · Follow the cast care instructions your doctor gives you. If you have a splint, do not take it off unless your doctor tells you to. Cast and splint care  · If you have a removable splint, ask your doctor if it is okay to remove it to bathe. Your doctor may want you to keep it on as much as possible. · Keep your plaster splint covered by taping a sheet of plastic around it when you bathe. Water under the plaster can cause your skin to itch and hurt. · Never cut your splint. When should you call for help? [id number] anytime you think you may need emergency care. For example, call if:  · You have chest pain, are short of breath, or you cough up blood. Call your doctor now or seek immediate medical care if:  · You have new or worse pain. · Your foot is cool or pale or changes color. · You have tingling, weakness, or numbness in your toes. · Your cast or splint feels too tight.   · You have signs of a blood clot in your leg (called a deep vein thrombosis), such as:  ? Pain in your calf, back of the knee, thigh, or groin. ? Redness or swelling in your leg. Watch closely for changes in your health, and be sure to contact your doctor if:  · You have a problem with your splint or cast.  · You do not get better as expected. Where can you learn more? Go to http://darinel-nova.info/  Enter [id number] in the search box to learn more about \"Broken Foot: Care Instructions. \"  Current as of: March 2, 2020               Content Version: 12.5  © 3358-7471 [employer]. Care instructions adapted under license by [employer] (which disclaims liability or warranty for this information). If you have questions about a medical condition or this instruction, always ask your healthcare professional. [address] any warranty or liability for your use of this information. Patient Education        Shoulder Pain: Care Instructions  Your Care Instructions     You can hurt your shoulder by using it too much during an activity, such as fishing or baseball. It can also happen as part of the everyday wear and tear of getting older. Shoulder injuries can be slow to heal, but your shoulder should get better with time. Your doctor may recommend a sling to rest your shoulder. If you have injured your shoulder, you may need testing and treatment. Follow-up care is a key part of your treatment and safety. Be sure to make and go to all appointments, and call your doctor if you are having problems. It's also a good idea to know your test results and keep a list of the medicines you take. How can you care for yourself at home? · Take pain medicines exactly as directed. ? If the doctor gave you a prescription medicine for pain, take it as prescribed. ? If you are not taking a prescription pain medicine, ask your doctor if you can take an over-the-counter medicine. ? Do not take two or more pain medicines at the same time unless the doctor told you to.  Many pain medicines contain acetaminophen, which is Tylenol. Too much acetaminophen (Tylenol) can be harmful. · If your doctor recommends that you wear a sling, use it as directed. Do not take it off before your doctor tells you to. · Put ice or a cold pack on the sore area for 10 to 20 minutes at a time. Put a thin cloth between the ice and your skin. · If there is no swelling, you can put moist heat, a heating pad, or a warm cloth on your shoulder. Some doctors suggest alternating between hot and cold. · Rest your shoulder for a few days. If your doctor recommends it, you can then begin gentle exercise of the shoulder, but do not lift anything heavy. When should you call for help? [id number] anytime you think you may need emergency care. For example, call if:  · You have chest pain or pressure. This may occur with:  ? Sweating. ? Shortness of breath. ? Nausea or vomiting. ? Pain that spreads from the chest to the neck, jaw, or one or both shoulders or arms. ? Dizziness or lightheadedness. ? A fast or uneven pulse. After calling 911, chew 1 adult-strength aspirin. Wait for an ambulance. Do not try to drive yourself. · Your arm or hand is cool or pale or changes color. Call your doctor now or seek immediate medical care if:  · You have signs of infection, such as:  ? Increased pain, swelling, warmth, or redness in your shoulder. ? Red streaks leading from a place on your shoulder. ? Pus draining from an area of your shoulder. ? Swollen lymph nodes in your neck, armpits, or groin. ? A fever. Watch closely for changes in your health, and be sure to contact your doctor if:  · You cannot use your shoulder. · Your shoulder does not get better as expected. Where can you learn more? Go to http://darinel-nova.info/  Enter H996 in the search box to learn more about \"Shoulder Pain: Care Instructions. \"  Current as of: March 2, 2020               Content Version: 12.5  © 5520-7414 [employer].    Care instructions adapted under license by [employer] (which disclaims liability or warranty for this information). If you have questions about a medical condition or this instruction, always ask your healthcare professional. [address] any warranty or liability for your use of this information. We hope that we have addressed all of your medical concerns. The examination and treatment you received in the Emergency Department were for an emergent problem and were not intended as complete care. It is important that you follow up with your healthcare provider(s) for ongoing care. If your symptoms worsen or do not improve as expected, and you are unable to reach your usual health care provider(s), you should return to the Emergency Department. Today's healthcare is undergoing tremendous change, and patient satisfaction surveys are one of the many tools to assess the quality of medical care. You may receive a survey from the [employer] organization regarding your experience in the Emergency Department. I hope that your experience has been completely positive, particularly the medical care that I provided. As such, please participate in the survey; anything less than excellent does not meet my expectations or intentions. [address] [facility] and [address] [place] [address] participate in nationally recognized quality of care measures. If your blood pressure is greater than 120/80, as reported below, we urge that you seek medical care to address the potential of high blood pressure, commonly known as hypertension. Hypertension can be hereditary or can be caused by certain medical conditions, pain, stress, or \"white coat syndrome. \"       Please make an appointment with your health care provider(s) for follow up of your Emergency Department visit.        VITALS:   Patient Vitals for the past 8 hrs:   Temp Pulse Resp BP SpO2   06/13/20 2300 -- -- -- (!) 135/93 98 %   06/13/20 2150 98.2 °F (36.8 °C) 100 16 (!) 153/115 98 %          Thank you for allowing us to provide you with medical care today. We realize that you have many choices for your emergency care needs. Please choose us in the future for any continued health care needs. Tan Vargasie Abraham, [address] [facility]: [phone]            No results found for this or any previous visit (from the past 24 hour(s)). Xr Shoulder Lt Ap/lat Min 2 V    Result Date: 6/13/2020  EXAM: XR SHOULDER LT AP/LAT MIN 2 V INDICATION: pain, anterior shoulder. COMPARISON: None. FINDINGS: Three views of the left shoulder demonstrate no fracture, dislocation or other acute abnormality. There is a greater tuberosity spur. IMPRESSION: Greater tuberosity spur. No acute fracture. [place] Foot Lt Min 3 V    Result Date: 6/13/2020  EXAM: XR FOOT LT MIN 3 V INDICATION: fall off roof, pain over left 4th metatarsal. COMPARISON: None. FINDINGS: Three views of the left foot demonstrate a subtle impaction of the fourth metatarsal neck. IMPRESSION: Subtle left fourth metatarsal neck impacted fracture.

## 2020-06-14 NOTE — ED NOTES
Ambulated patient to bathroom. Patient seen limping and stated he was limping due to left foot pain.

## 2020-06-14 NOTE — ED TRIAGE NOTES
Triage: Pt arrives from home with CC of pain to the left foot after jumping off of a 13-15 foot roof. He reports he landed on his foot on a root or stump. Pt denies any other pain except some pre existing shoulder pain.

## 2020-06-14 NOTE — ED PROVIDER NOTES
HPI   35 yo M presents with left foot pain onset earlier today when he jumped off a roof about 12-15ft high, landed on grass. Landed on feet and then tucked and rolled. Denies head injury or LOC, no neck pain. Pain minimal and worse with bearing weight. Pt c/o pain to left mid foot. Denies back pain. Pt states he is able to walk and bear weight. Pt states one month ago he hit his left shoulder on a marble ledge and has been having pain since. Pt is right handed. Denies weakness/numbness.   Past Medical History:   Diagnosis Date    Alcoholism ([facility] [address]     Dystonia     Elevated liver enzymes 9/15/2017    Gastric ulcer     Leucopenia 5/30/2019    Pancreatitis     alcohol induced       Past Surgical History:   Procedure Laterality Date    HX ORTHOPAEDIC      rib fracture- LEFT and RIGHT         Family History:   Problem Relation Age of Onset    Diabetes Mother     Alcohol abuse Mother     Diabetes Maternal Grandfather     Hypertension Father     Alcohol abuse Father     Alcohol abuse Brother        Social History     Socioeconomic History    Marital status: SINGLE     Spouse name: Not on file    Number of children: Not on file    Years of education: Not on file    Highest education level: Not on file   Occupational History    Not on file   Social Needs    Financial resource strain: Not on file    Food insecurity     Worry: Not on file     Inability: Not on file    Transportation needs     Medical: Not on file     Non-medical: Not on file   Tobacco Use    Smoking status: Current Some Day Smoker     Packs/day: 1.00    Smokeless tobacco: Current User     Types: Snuff    Tobacco comment: uses chews    Substance and Sexual Activity    Alcohol use: Not Currently     Alcohol/week: 84.0 standard drinks     Types: 84 Cans of beer per week     Comment: 3 months sober 8/1/19    Drug use: No    Sexual activity: Yes     Partners: Female   Lifestyle    Physical activity     Days per week: Not on file Minutes per session: Not on file    Stress: Not on file   Relationships    Social connections     Talks on phone: Not on file     Gets together: Not on file     Attends Sikhism service: Not on file     Active member of club or organization: Not on file     Attends meetings of clubs or organizations: Not on file     Relationship status: Not on file    Intimate partner violence     Fear of current or ex partner: Not on file     Emotionally abused: Not on file     Physically abused: Not on file     Forced sexual activity: Not on file   Other Topics Concern    Not on file   Social History Narrative    Not on file         ALLERGIES: Pcn [penicillins]    Review of Systems   Constitutional: Negative for chills and fever. Respiratory: Negative for cough and shortness of breath. Gastrointestinal: Negative for nausea and vomiting. Musculoskeletal: Positive for arthralgias and gait problem. Negative for neck pain and neck stiffness. Skin: Negative for rash and wound. Neurological: Negative for weakness, numbness and headaches. All other systems reviewed and are negative.       Vitals:    06/13/20 2150 06/13/20 2300   BP: (!) 153/115 (!) 135/93   Pulse: 100    Resp: 16    Temp: 98.2 °F (36.8 °C)    SpO2: 98% 98%            Physical Exam   Physical Examination: General appearance - alert, well appearing, and in no distress, oriented to person, place, and time and normal appearing weight  HEENT-atraumatic  Eyes - pupils equal and reactive, extraocular eye movements intact  Neck - supple, no significant adenopathy, no midline c-spine tenderness or pain with rom  Chest - clear to auscultation, no wheezes, rales or rhonchi, symmetric air entry  Heart - normal rate, regular rhythm, normal S1, S2, no murmurs, rubs, clicks or gallops  Abdomen - soft, nontender, nondistended, no masses or organomegaly  Back exam - full range of motion, no tenderness, palpable spasm or pain on motion, no midline spinal tenderness or step-offs  Neurological - alert, oriented, normal speech, no focal findings or movement disorder noted  Musculoskeletal -tenderness over distal left fourth metatarsal, no deformity or swelling, neurovascularly intact with strong pedal pulses  No calcaneal tenderness  mild anterior tenderness to left shoulder, neurovascular intact distally with strong radial pulse, no deformity or swelling, full range of motion of left shoulder mildly limited due to pain  Extremities - peripheral pulses normal, no pedal edema, no clubbing or cyanosis  Skin - normal coloration and turgor, no rashes, no suspicious skin lesions noted  MDM  Number of Diagnoses or Management Options     Amount and/or Complexity of Data Reviewed  Tests in the radiology section of CPT®: ordered and reviewed  Decide to obtain previous medical records or to obtain history from someone other than the patient: yes  Review and summarize past medical records: yes  Independent visualization of images, tracings, or specimens: yes    Patient Progress  Patient progress: improved         Procedures    Fracture left fourth metatarsal.  Will splint with crutches and follow-up with Ortho. Splinted by nursing. Patient examined after splint placement. Neurovascularly intact with good placement.

## 2020-06-23 NOTE — PROGRESS NOTES
HISTORY OF PRESENT ILLNESS  Mark Anthony Jang is a 33 y.o. male. HPI  Follow up chronic health problems and new injury. Chronic gastritis secondary to alcoholism. Taking dexilant daily with some sx relief. Sx usually flare with consuming increased quantities of alcohol and not eating. Reports he has very little appetite. Alcoholism. Continues to consume about 24 beers/daily. Has been working on decreasing by 1-2 beers every day. Has recently looked into rehab facility in [place] called [address]. Seriously considering pursuing this. Overall feeling miserable most of the time. C/o left foot pain. Reports a horse stepped on top of his foot. He was barefoot and struck a rock under his foot. C/o pain under his foot, aggravated by weight bearing. Past Medical History:   Diagnosis Date    Dystonia     Gastric ulcer     Pancreatitis     alcohol induced     Patient Active Problem List   Diagnosis Code    Elevated liver enzymes R74.8    Chronic alcoholic gastritis without hemorrhage K29.20    Alcoholism ([facility] [address] F10.20     Current Outpatient Prescriptions   Medication Sig    multivitamin (ONE A DAY) tablet Take 1 Tab by mouth daily.  Dexlansoprazole (DEXILANT) 60 mg CpDB TAKE 1 CAPSULE BY MOUTH EVERY DAY.  acetaminophen (TYLENOL) 325 mg tablet Take  by mouth every four (4) hours as needed for Pain. No current facility-administered medications for this visit. Social History   Substance Use Topics    Smoking status: Current Some Day Smoker    Smokeless tobacco: Current User      Comment: and chews tobacc0    Alcohol use 50.4 oz/week     84 Cans of beer per week     Visit Vitals    /76 (BP 1 Location: Left arm, BP Patient Position: Sitting)    Pulse 63    Temp 98.2 °F (36.8 °C) (Oral)    Resp 18    Ht 6' 1\" (1.854 m)    Wt 155 lb (70.3 kg)    SpO2 98%    BMI 20.45 kg/m2     Review of Systems   Constitutional: Negative for chills and fever.    Respiratory: Negative for cough and shortness of breath. Cardiovascular: Negative for chest pain and palpitations. Gastrointestinal: Positive for blood in stool (rare) and nausea. Negative for abdominal pain, diarrhea, melena and vomiting. Musculoskeletal:        Left foot pain. Neurological: Negative for weakness. Psychiatric/Behavioral: Positive for depression (mild) and substance abuse. Negative for hallucinations and suicidal ideas. The patient is not nervous/anxious and does not have insomnia. All other systems reviewed and are negative. Physical Exam   Constitutional: No distress. Thin appearing   Neck: Neck supple. Cardiovascular: Normal rate, regular rhythm and normal heart sounds. Pulmonary/Chest: Effort normal and breath sounds normal.   Abdominal: Soft. He exhibits no distension. There is no tenderness. There is no guarding. Musculoskeletal:        Left foot: There is tenderness (instep,no ecchymosis). There is normal range of motion, no bony tenderness, no swelling, normal capillary refill and no laceration. Lymphadenopathy:     He has no cervical adenopathy. Neurological: He is alert. Coordination normal.   Skin: Skin is warm and dry. Psychiatric: His behavior is normal. Thought content normal.   Flat affect. ASSESSMENT and PLAN  Diagnoses and all orders for this visit:    1. Chronic alcoholic gastritis without hemorrhage  -     Dexlansoprazole (DEXILANT) 60 mg CpDB; TAKE 1 CAPSULE BY MOUTH EVERY DAY. Moderately stable on dexilant. Continue current treatment. 2. Alcoholism ([address]  Strongly encouraged pursuing rehab. Life threatening complications of chronic alcohol abuse discussed. 3. Foot injury, left, initial encounter  Contusion. Recommend supportive footwear. Ice x 15 minutes qid. Follow up 6 months or sooner prn. Greater than 50% of 30 minute visit spent counseling regarding options for alcoholism rehab and coordinating care. PACU CTU

## 2020-07-15 DIAGNOSIS — K29.20 CHRONIC ALCOHOLIC GASTRITIS WITHOUT HEMORRHAGE: ICD-10-CM

## 2020-07-15 NOTE — TELEPHONE ENCOUNTER
Please contact patient for scheduling. Thanks        Last visit 08/26/2019 NP Claire   Next appointment None   Previous refill encounter(s) 09/09/2019 Dexilant #30 with 5 refills    Requested Prescriptions     Pending Prescriptions Disp Refills    dexlansoprazole (Dexilant) 60 mg CpDB capsule (delayed release) 90 Cap 0     Sig: Take 1 Cap by mouth daily. Rahul Wynne

## 2020-07-19 RX ORDER — DEXLANSOPRAZOLE 60 MG/1
1 CAPSULE, DELAYED RELEASE ORAL DAILY
Qty: 90 CAP | Refills: 0 | Status: SHIPPED | OUTPATIENT
Start: 2020-07-19 | End: 2020-12-31 | Stop reason: SDUPTHER

## 2020-07-23 ENCOUNTER — OFFICE VISIT (OUTPATIENT)
Dept: FAMILY MEDICINE CLINIC | Age: 35
End: 2020-07-23

## 2020-07-23 DIAGNOSIS — K29.20 CHRONIC ALCOHOLIC GASTRITIS WITHOUT HEMORRHAGE: Primary | ICD-10-CM

## 2020-07-23 DIAGNOSIS — F41.9 ANXIETY AND DEPRESSION: ICD-10-CM

## 2020-07-23 DIAGNOSIS — F10.20 ALCOHOLISM (HCC): ICD-10-CM

## 2020-07-23 DIAGNOSIS — F32.A ANXIETY AND DEPRESSION: ICD-10-CM

## 2020-07-23 RX ORDER — MIRTAZAPINE 15 MG/1
TABLET, FILM COATED ORAL
COMMUNITY
Start: 2020-06-19 | End: 2020-07-23 | Stop reason: ALTCHOICE

## 2020-07-23 RX ORDER — TRAZODONE HYDROCHLORIDE 50 MG/1
TABLET ORAL
COMMUNITY
End: 2020-07-23 | Stop reason: ALTCHOICE

## 2020-07-23 RX ORDER — TERBINAFINE HYDROCHLORIDE 250 MG/1
TABLET ORAL
COMMUNITY
End: 2020-07-23 | Stop reason: ALTCHOICE

## 2020-07-23 RX ORDER — HYDROXYZINE PAMOATE 50 MG/1
CAPSULE ORAL
COMMUNITY
Start: 2020-01-18 | End: 2021-11-08 | Stop reason: ALTCHOICE

## 2020-07-23 RX ORDER — DEXLANSOPRAZOLE 60 MG/1
CAPSULE, DELAYED RELEASE ORAL
COMMUNITY
Start: 2020-03-30 | End: 2021-04-05 | Stop reason: SDUPTHER

## 2020-07-23 RX ORDER — BACLOFEN 10 MG/1
TABLET ORAL
COMMUNITY
End: 2020-07-23 | Stop reason: ALTCHOICE

## 2020-07-23 RX ORDER — MIRTAZAPINE 15 MG/1
TABLET, FILM COATED ORAL
COMMUNITY
Start: 2020-04-01 | End: 2020-07-23 | Stop reason: ALTCHOICE

## 2020-07-23 NOTE — PROGRESS NOTES
HISTORY OF PRESENT ILLNESS  Mary Hargrove is a 36 y.o. male. HPI  Follow up health problems. I was in the office while conducting this encounter. Consent:  He and/or his healthcare decision maker is aware that this patient-initiated Telehealth encounter is a billable service, with coverage as determined by his insurance carrier. He is aware that he may receive a bill and has provided verbal consent to proceed: Yes    This virtual visit was conducted telephone encounter only. -  I affirm this is a Patient Initiated Episode with an Established Patient who has not had a related appointment within my department in the past 7 days or scheduled within the next 24 hours. Note: this encounter is not billable if this call serves to triage the patient into an appointment for the relevant concern. Total Time: minutes: 11-20 minutes. Chronic alcoholic gastritis. Taking dexilant daily with good sx control. Alcoholism. Patient last seen almost 1 year ago. Was sober for 3 months in alcohol treatment center. Reports a relapse after 11 months sober with correction time and now probation after DUI. Has to attend mandatory alcohol safety awareness program or return to correction. States he has intentions of stopping drinking prior to attending program.  He will be monitored with periodic urine drug screens. Anxiety/depression. Stopped all meds when resumed drinking. Patient Active Problem List   Diagnosis Code    Chronic alcoholic gastritis without hemorrhage K29.20    Alcoholism ([facility] [address] F10.20    Insomnia G47.00    Anxiety and depression F41.9, F32.9     Current Outpatient Medications   Medication Sig    dexlansoprazole (Dexilant) 60 mg CpDB capsule (delayed release) Dexilant 60 mg capsule, delayed release    hydrOXYzine pamoate (VISTARIL) 50 mg capsule     dexlansoprazole (Dexilant) 60 mg CpDB capsule (delayed release) Take 1 Cap by mouth daily.  Needs office visit    hydrOXYzine pamoate (VISTARIL) 50 mg capsule Take 1 Cap by mouth nightly. Appointment due. No current facility-administered medications for this visit. Social History     Tobacco Use    Smoking status: Current Some Day Smoker     Packs/day: 1.00    Smokeless tobacco: Current User     Types: Snuff    Tobacco comment: uses chews    Substance Use Topics    Alcohol use: Not Currently     Alcohol/week: 84.0 standard drinks     Types: 84 Cans of beer per week     Comment: 3 months sober 8/1/19    Drug use: No       Review of Systems   Respiratory: Negative. Cardiovascular: Negative. Gastrointestinal: Positive for heartburn (stable on med). Negative for abdominal pain, blood in stool and melena. Psychiatric/Behavioral: Positive for substance abuse. Negative for depression, hallucinations and suicidal ideas. The patient is not nervous/anxious and does not have insomnia. All other systems reviewed and are negative. Physical Exam  Audio only. ASSESSMENT and PLAN  Diagnoses and all orders for this visit:    1. Chronic alcoholic gastritis without hemorrhage  Stable on dexilant. Continue current treatment. 2. Alcoholism ([facility] [address]  Strongly advised alcohol cessation and encouragement to continue in rehab program.    3. Anxiety and depression  Recommend resume medications when he begins rehab. He declined at this time. Follow up 6 months or sooner prn. We discussed the expected course, resolution and complications of the diagnosis in detail. Medication risks, benefits, costs, interactions and side effects were discussed. The patient was advised to contact the office for worsening of condition or FTI as anticipated. The patient expressed understanding of the diagnosis and plan.

## 2020-07-23 NOTE — PROGRESS NOTES
Chief Complaint   Patient presents with    Anxiety    Depression    Insomnia    Other     Gastritis      1. Have you been to the ER, urgent care clinic since your last visit? Hospitalized since your last visit? No    2. Have you seen or consulted any other health care providers outside of the [address] [place] [address] since your last visit? Include any pap smears or colon screening.  No

## 2020-12-31 DIAGNOSIS — K29.20 CHRONIC ALCOHOLIC GASTRITIS WITHOUT HEMORRHAGE: ICD-10-CM

## 2020-12-31 RX ORDER — DEXLANSOPRAZOLE 60 MG/1
1 CAPSULE, DELAYED RELEASE ORAL DAILY
Qty: 90 CAP | Refills: 0 | Status: SHIPPED | OUTPATIENT
Start: 2020-12-31 | End: 2021-04-05

## 2020-12-31 NOTE — TELEPHONE ENCOUNTER
Last Visit: 9/2/20  NP Claire  Next Appointment: Not scheduled- due 1/2021  Previous Refill Encounter(s): 7/19/20 90    Requested Prescriptions     Pending Prescriptions Disp Refills    dexlansoprazole (Dexilant) 60 mg CpDB capsule (delayed release) 90 Cap 0     Sig: Take 1 Cap by mouth daily.  Needs office visit

## 2021-11-08 ENCOUNTER — OFFICE VISIT (OUTPATIENT)
Dept: FAMILY MEDICINE CLINIC | Age: 36
End: 2021-11-08
Payer: COMMERCIAL

## 2021-11-08 VITALS
BODY MASS INDEX: 21.47 KG/M2 | HEART RATE: 73 BPM | DIASTOLIC BLOOD PRESSURE: 81 MMHG | OXYGEN SATURATION: 96 % | SYSTOLIC BLOOD PRESSURE: 124 MMHG | RESPIRATION RATE: 16 BRPM | WEIGHT: 162 LBS | TEMPERATURE: 98.3 F | HEIGHT: 73 IN

## 2021-11-08 DIAGNOSIS — J30.89 NON-SEASONAL ALLERGIC RHINITIS, UNSPECIFIED TRIGGER: ICD-10-CM

## 2021-11-08 DIAGNOSIS — F10.20 ALCOHOLISM (HCC): ICD-10-CM

## 2021-11-08 DIAGNOSIS — K29.20 CHRONIC ALCOHOLIC GASTRITIS WITHOUT HEMORRHAGE: ICD-10-CM

## 2021-11-08 DIAGNOSIS — L30.9 DERMATITIS: Primary | ICD-10-CM

## 2021-11-08 PROCEDURE — 99214 OFFICE O/P EST MOD 30 MIN: CPT | Performed by: NURSE PRACTITIONER

## 2021-11-08 RX ORDER — BETAMETHASONE DIPROPIONATE 0.5 MG/G
CREAM TOPICAL 2 TIMES DAILY
Qty: 45 G | Refills: 0 | Status: SHIPPED | OUTPATIENT
Start: 2021-11-08

## 2021-11-08 RX ORDER — LEVOCETIRIZINE DIHYDROCHLORIDE 5 MG/1
TABLET, FILM COATED ORAL DAILY
COMMUNITY

## 2021-11-08 NOTE — PROGRESS NOTES
Chief Complaint   Patient presents with    Abdominal Pain     follow up         1. \"Have you been to the ER, urgent care clinic since your last visit? Hospitalized since your last visit? \" Yes When: 10/21 Where: hand fracture Reason for visit: hand fracture    2. \"Have you seen or consulted any other health care providers outside of the [address] [place] [address] since your last visit? \" No     Have you had the covid vaccine. .. when    3 most recent PHQ Screens 7/23/2020   Little interest or pleasure in doing things Not at all   Feeling down, depressed, irritable, or hopeless Not at all   Total Score PHQ 2 0       Health Maintenance Due   Topic Date Due    Pneumococcal 0-64 years (1 of 2 - PPSV23) Never done    COVID-19 Vaccine (1) Never done    Flu Vaccine (1) Never done

## 2021-11-08 NOTE — PROGRESS NOTES
HISTORY OF PRESENT ILLNESS  Srinivas Corey is a 29 y.o. male. HPI  Follow up office visit. Patient not seen in over a year. History of alcoholism. Has had a few unsuccessful attempts with rehab. States he is not mentally ready at this time to stop drinking. Has cut back since getting employment. 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 Alcohol gastritis. Taking dexilant daily with adequate symptom control. Has been eating better lately, more healthy foods. AR. Taking xyzal nightly with good symptom control. C/o itchy rash under left arm over past week. Using benadryl cream with minor sx relief. Patient Active Problem List   Diagnosis Code    Chronic alcoholic gastritis without hemorrhage K29.20    Alcoholism ([facility] [address] F10.20    Insomnia G47.00    Anxiety and depression F41.9, F32. A    Non-seasonal allergic rhinitis J30.89     Current Outpatient Medications   Medication Sig    levocetirizine (Xyzal) 5 mg tablet Take  by mouth daily.  betamethasone dipropionate (DIPROSONE) 0.05 % topical cream Apply  to affected area two (2) times a day.  dexlansoprazole (Dexilant) 60 mg CpDB capsule (delayed release) Take 1 Capsule by mouth daily. Appointment due. No current facility-administered medications for this visit. Social History     Tobacco Use    Smoking status: Current Some Day Smoker     Packs/day: 1.00    Smokeless tobacco: Current User     Types: Snuff    Tobacco comment: uses chews    Substance Use Topics    Alcohol use: Not Currently     Alcohol/week: 84.0 standard drinks     Types: 84 Cans of beer per week     Comment: 3 months sober 8/1/19    Drug use: No     Blood pressure 124/81, pulse 73, temperature 98.3 °F (36.8 °C), resp. rate 16, height 6' 1\" (1.854 m), weight 162 lb (73.5 kg), SpO2 96 %. Review of Systems   Respiratory: Negative for cough and shortness of breath. Cardiovascular: Negative for chest pain, palpitations and leg swelling. Gastrointestinal: Positive for heartburn (stable). Negative for abdominal pain, nausea and vomiting. Skin: Positive for itching and rash. Psychiatric/Behavioral: Negative for depression and suicidal ideas. The patient is not nervous/anxious. All other systems reviewed and are negative. Physical Exam  Constitutional:       General: He is not in acute distress. Comments: Thin appearing. Cardiovascular:      Rate and Rhythm: Normal rate and regular rhythm. Heart sounds: Normal heart sounds. Pulmonary:      Effort: Pulmonary effort is normal.      Breath sounds: Normal breath sounds. Abdominal:      General: There is no distension. Palpations: Abdomen is soft. There is no mass. Tenderness: There is no abdominal tenderness. Musculoskeletal:      Cervical back: Neck supple. Right lower leg: No edema. Left lower leg: No edema. Lymphadenopathy:      Cervical: No cervical adenopathy. Skin:     General: Skin is warm and dry. Comments: Erythematous scaly patch left axilla. Neurological:      Coordination: Coordination normal.   Psychiatric:         Mood and Affect: Mood normal.         Behavior: Behavior normal.         ASSESSMENT and PLAN  Diagnoses and all orders for this visit:    1. Dermatitis  -     betamethasone dipropionate (DIPROSONE) 0.05 % topical cream; Apply  to affected area two (2) times a day. 2. Chronic alcoholic gastritis without hemorrhage  Stable on dexilant. 3. Alcoholism ([facility] [address]  Discussed cessation and potential complications of alcoholism including but not limited to liver failure. Reports he is not ready for complete cessation at this time. Recommend healthy diet, vitamin supplements. 4. Non-seasonal allergic rhinitis, unspecified trigger  Stable on xyzal.    Follow up 6 months. We discussed the expected course, resolution and complications of the diagnosis in detail.   Medication risks, benefits, costs, interactions and side effects were discussed. The patient was advised to contact the office for worsening of condition or FTI as anticipated. The patient expressed understanding of the diagnosis and plan.

## 2021-12-15 ENCOUNTER — APPOINTMENT (OUTPATIENT)
Dept: CT IMAGING | Age: 36
DRG: 440 | End: 2021-12-15
Attending: EMERGENCY MEDICINE
Payer: COMMERCIAL

## 2021-12-15 ENCOUNTER — APPOINTMENT (OUTPATIENT)
Dept: ULTRASOUND IMAGING | Age: 36
DRG: 440 | End: 2021-12-15
Attending: FAMILY MEDICINE
Payer: COMMERCIAL

## 2021-12-15 ENCOUNTER — HOSPITAL ENCOUNTER (INPATIENT)
Age: 36
LOS: 2 days | Discharge: HOME | DRG: 440 | End: 2021-12-17
Attending: EMERGENCY MEDICINE | Admitting: FAMILY MEDICINE
Payer: COMMERCIAL

## 2021-12-15 DIAGNOSIS — F10.10 ALCOHOL ABUSE: ICD-10-CM

## 2021-12-15 DIAGNOSIS — R79.89 ELEVATED LFTS: ICD-10-CM

## 2021-12-15 DIAGNOSIS — K85.20 ALCOHOL-INDUCED ACUTE PANCREATITIS WITHOUT INFECTION OR NECROSIS: Primary | ICD-10-CM

## 2021-12-15 PROBLEM — K85.90 PANCREATITIS: Status: ACTIVE | Noted: 2021-12-15

## 2021-12-15 LAB
ALBUMIN SERPL-MCNC: 4.1 G/DL (ref 3.5–5)
ALBUMIN/GLOB SERPL: 0.9 {RATIO} (ref 1.1–2.2)
ALP SERPL-CCNC: 92 U/L (ref 45–117)
ALT SERPL-CCNC: 105 U/L (ref 12–78)
AMPHET UR QL SCN: NEGATIVE
ANION GAP SERPL CALC-SCNC: 6 MMOL/L (ref 5–15)
AST SERPL-CCNC: 117 U/L (ref 15–37)
BARBITURATES UR QL SCN: NEGATIVE
BASOPHILS # BLD: 0 K/UL (ref 0–0.1)
BASOPHILS NFR BLD: 0 % (ref 0–1)
BENZODIAZ UR QL: NEGATIVE
BILIRUB SERPL-MCNC: 2.9 MG/DL (ref 0.2–1)
BUN SERPL-MCNC: 7 MG/DL (ref 6–20)
BUN/CREAT SERPL: 10 (ref 12–20)
CALCIUM SERPL-MCNC: 10.2 MG/DL (ref 8.5–10.1)
CANNABINOIDS UR QL SCN: NEGATIVE
CHLORIDE SERPL-SCNC: 100 MMOL/L (ref 97–108)
CHOLEST SERPL-MCNC: 256 MG/DL
CO2 SERPL-SCNC: 29 MMOL/L (ref 21–32)
COCAINE UR QL SCN: NEGATIVE
COMMENT, HOLDF: NORMAL
CREAT SERPL-MCNC: 0.67 MG/DL (ref 0.7–1.3)
DIFFERENTIAL METHOD BLD: ABNORMAL
DRUG SCRN COMMENT,DRGCM: ABNORMAL
EOSINOPHIL # BLD: 0 K/UL (ref 0–0.4)
EOSINOPHIL NFR BLD: 0 % (ref 0–7)
ERYTHROCYTE [DISTWIDTH] IN BLOOD BY AUTOMATED COUNT: 13.1 % (ref 11.5–14.5)
ETHANOL SERPL-MCNC: <10 MG/DL
GLOBULIN SER CALC-MCNC: 4.4 G/DL (ref 2–4)
GLUCOSE SERPL-MCNC: 149 MG/DL (ref 65–100)
HAV IGM SER QL: NONREACTIVE
HBV CORE IGM SER QL: NONREACTIVE
HBV SURFACE AG SER QL: 0.17 INDEX
HBV SURFACE AG SER QL: NEGATIVE
HCT VFR BLD AUTO: 48 % (ref 36.6–50.3)
HCV AB SERPL QL IA: NONREACTIVE
HDLC SERPL-MCNC: 136 MG/DL
HDLC SERPL: 1.9 {RATIO} (ref 0–5)
HGB BLD-MCNC: 16.3 G/DL (ref 12.1–17)
IMM GRANULOCYTES # BLD AUTO: 0 K/UL (ref 0–0.04)
IMM GRANULOCYTES NFR BLD AUTO: 0 % (ref 0–0.5)
INR PPP: 1 (ref 0.9–1.1)
LDLC SERPL CALC-MCNC: 104.8 MG/DL (ref 0–100)
LIPASE SERPL-CCNC: >3000 U/L (ref 73–393)
LYMPHOCYTES # BLD: 0.7 K/UL (ref 0.8–3.5)
LYMPHOCYTES NFR BLD: 7 % (ref 12–49)
MAGNESIUM SERPL-MCNC: 1.8 MG/DL (ref 1.6–2.4)
MCH RBC QN AUTO: 32 PG (ref 26–34)
MCHC RBC AUTO-ENTMCNC: 34 G/DL (ref 30–36.5)
MCV RBC AUTO: 94.1 FL (ref 80–99)
METHADONE UR QL: NEGATIVE
MONOCYTES # BLD: 1 K/UL (ref 0–1)
MONOCYTES NFR BLD: 10 % (ref 5–13)
NEUTS SEG # BLD: 8.4 K/UL (ref 1.8–8)
NEUTS SEG NFR BLD: 83 % (ref 32–75)
NRBC # BLD: 0 K/UL (ref 0–0.01)
NRBC BLD-RTO: 0 PER 100 WBC
OPIATES UR QL: POSITIVE
PCP UR QL: NEGATIVE
PHOSPHATE SERPL-MCNC: 3.8 MG/DL (ref 2.6–4.7)
PLATELET # BLD AUTO: 189 K/UL (ref 150–400)
PMV BLD AUTO: 9.7 FL (ref 8.9–12.9)
POTASSIUM SERPL-SCNC: 4.6 MMOL/L (ref 3.5–5.1)
PROT SERPL-MCNC: 8.5 G/DL (ref 6.4–8.2)
PROTHROMBIN TIME: 10.4 SEC (ref 9–11.1)
RBC # BLD AUTO: 5.1 M/UL (ref 4.1–5.7)
RBC MORPH BLD: ABNORMAL
SAMPLES BEING HELD,HOLD: NORMAL
SODIUM SERPL-SCNC: 135 MMOL/L (ref 136–145)
SP1: NORMAL
SP2: NORMAL
SP3: NORMAL
TRIGL SERPL-MCNC: 76 MG/DL (ref ?–150)
UR CULT HOLD, URHOLD: NORMAL
VLDLC SERPL CALC-MCNC: 15.2 MG/DL
WBC # BLD AUTO: 10.1 K/UL (ref 4.1–11.1)

## 2021-12-15 PROCEDURE — 85610 PROTHROMBIN TIME: CPT

## 2021-12-15 PROCEDURE — 74011250636 HC RX REV CODE- 250/636: Performed by: FAMILY MEDICINE

## 2021-12-15 PROCEDURE — 83735 ASSAY OF MAGNESIUM: CPT

## 2021-12-15 PROCEDURE — 80061 LIPID PANEL: CPT

## 2021-12-15 PROCEDURE — 82077 ASSAY SPEC XCP UR&BREATH IA: CPT

## 2021-12-15 PROCEDURE — 74177 CT ABD & PELVIS W/CONTRAST: CPT

## 2021-12-15 PROCEDURE — 84100 ASSAY OF PHOSPHORUS: CPT

## 2021-12-15 PROCEDURE — 36415 COLL VENOUS BLD VENIPUNCTURE: CPT

## 2021-12-15 PROCEDURE — 99282 EMERGENCY DEPT VISIT SF MDM: CPT

## 2021-12-15 PROCEDURE — 96375 TX/PRO/DX INJ NEW DRUG ADDON: CPT

## 2021-12-15 PROCEDURE — 83690 ASSAY OF LIPASE: CPT

## 2021-12-15 PROCEDURE — 80074 ACUTE HEPATITIS PANEL: CPT

## 2021-12-15 PROCEDURE — 74011000636 HC RX REV CODE- 636: Performed by: EMERGENCY MEDICINE

## 2021-12-15 PROCEDURE — 65270000032 HC RM SEMIPRIVATE

## 2021-12-15 PROCEDURE — 99283 EMERGENCY DEPT VISIT LOW MDM: CPT

## 2021-12-15 PROCEDURE — 96374 THER/PROPH/DIAG INJ IV PUSH: CPT

## 2021-12-15 PROCEDURE — 80307 DRUG TEST PRSMV CHEM ANLYZR: CPT

## 2021-12-15 PROCEDURE — 80053 COMPREHEN METABOLIC PANEL: CPT

## 2021-12-15 PROCEDURE — 76705 ECHO EXAM OF ABDOMEN: CPT

## 2021-12-15 PROCEDURE — 85025 COMPLETE CBC W/AUTO DIFF WBC: CPT

## 2021-12-15 PROCEDURE — 74011250636 HC RX REV CODE- 250/636: Performed by: EMERGENCY MEDICINE

## 2021-12-15 RX ORDER — HEPARIN SODIUM 5000 [USP'U]/ML
5000 INJECTION, SOLUTION INTRAVENOUS; SUBCUTANEOUS EVERY 8 HOURS
Status: DISCONTINUED | OUTPATIENT
Start: 2021-12-15 | End: 2021-12-17 | Stop reason: HOSPADM

## 2021-12-15 RX ORDER — HYDROMORPHONE HYDROCHLORIDE 1 MG/ML
1 INJECTION, SOLUTION INTRAMUSCULAR; INTRAVENOUS; SUBCUTANEOUS
Status: DISCONTINUED | OUTPATIENT
Start: 2021-12-15 | End: 2021-12-16

## 2021-12-15 RX ORDER — LORAZEPAM 1 MG/1
4 TABLET ORAL
Status: DISCONTINUED | OUTPATIENT
Start: 2021-12-15 | End: 2021-12-17 | Stop reason: HOSPADM

## 2021-12-15 RX ORDER — SODIUM CHLORIDE, SODIUM LACTATE, POTASSIUM CHLORIDE, CALCIUM CHLORIDE 600; 310; 30; 20 MG/100ML; MG/100ML; MG/100ML; MG/100ML
150 INJECTION, SOLUTION INTRAVENOUS CONTINUOUS
Status: DISCONTINUED | OUTPATIENT
Start: 2021-12-15 | End: 2021-12-17 | Stop reason: HOSPADM

## 2021-12-15 RX ORDER — ONDANSETRON 2 MG/ML
4 INJECTION INTRAMUSCULAR; INTRAVENOUS
Status: DISCONTINUED | OUTPATIENT
Start: 2021-12-15 | End: 2021-12-17 | Stop reason: HOSPADM

## 2021-12-15 RX ORDER — SODIUM CHLORIDE 0.9 % (FLUSH) 0.9 %
5-40 SYRINGE (ML) INJECTION AS NEEDED
Status: DISCONTINUED | OUTPATIENT
Start: 2021-12-15 | End: 2021-12-17 | Stop reason: HOSPADM

## 2021-12-15 RX ORDER — ONDANSETRON 2 MG/ML
4 INJECTION INTRAMUSCULAR; INTRAVENOUS
Status: COMPLETED | OUTPATIENT
Start: 2021-12-15 | End: 2021-12-15

## 2021-12-15 RX ORDER — HYDROMORPHONE HYDROCHLORIDE 1 MG/ML
0.5 INJECTION, SOLUTION INTRAMUSCULAR; INTRAVENOUS; SUBCUTANEOUS
Status: COMPLETED | OUTPATIENT
Start: 2021-12-15 | End: 2021-12-15

## 2021-12-15 RX ORDER — SODIUM CHLORIDE 0.9 % (FLUSH) 0.9 %
5-40 SYRINGE (ML) INJECTION EVERY 8 HOURS
Status: DISCONTINUED | OUTPATIENT
Start: 2021-12-15 | End: 2021-12-17 | Stop reason: HOSPADM

## 2021-12-15 RX ORDER — LORAZEPAM 2 MG/ML
2 INJECTION INTRAMUSCULAR
Status: DISCONTINUED | OUTPATIENT
Start: 2021-12-15 | End: 2021-12-17 | Stop reason: HOSPADM

## 2021-12-15 RX ORDER — LORAZEPAM 2 MG/ML
4 INJECTION INTRAMUSCULAR
Status: DISCONTINUED | OUTPATIENT
Start: 2021-12-15 | End: 2021-12-17 | Stop reason: HOSPADM

## 2021-12-15 RX ADMIN — HEPARIN SODIUM 5000 UNITS: 5000 INJECTION INTRAVENOUS; SUBCUTANEOUS at 13:27

## 2021-12-15 RX ADMIN — SODIUM CHLORIDE 1000 ML: 9 INJECTION, SOLUTION INTRAVENOUS at 10:30

## 2021-12-15 RX ADMIN — HEPARIN SODIUM 5000 UNITS: 5000 INJECTION INTRAVENOUS; SUBCUTANEOUS at 21:18

## 2021-12-15 RX ADMIN — HYDROMORPHONE HYDROCHLORIDE 0.5 MG: 1 INJECTION, SOLUTION INTRAMUSCULAR; INTRAVENOUS; SUBCUTANEOUS at 10:30

## 2021-12-15 RX ADMIN — ONDANSETRON 4 MG: 2 INJECTION INTRAMUSCULAR; INTRAVENOUS at 19:13

## 2021-12-15 RX ADMIN — HYDROMORPHONE HYDROCHLORIDE 1 MG: 1 INJECTION, SOLUTION INTRAMUSCULAR; INTRAVENOUS; SUBCUTANEOUS at 19:17

## 2021-12-15 RX ADMIN — ONDANSETRON 4 MG: 2 INJECTION INTRAMUSCULAR; INTRAVENOUS at 10:30

## 2021-12-15 RX ADMIN — HYDROMORPHONE HYDROCHLORIDE 1 MG: 1 INJECTION, SOLUTION INTRAMUSCULAR; INTRAVENOUS; SUBCUTANEOUS at 13:27

## 2021-12-15 RX ADMIN — Medication 10 ML: at 21:18

## 2021-12-15 RX ADMIN — SODIUM CHLORIDE, POTASSIUM CHLORIDE, SODIUM LACTATE AND CALCIUM CHLORIDE 150 ML/HR: 600; 310; 30; 20 INJECTION, SOLUTION INTRAVENOUS at 13:27

## 2021-12-15 RX ADMIN — IOPAMIDOL 100 ML: 755 INJECTION, SOLUTION INTRAVENOUS at 11:07

## 2021-12-15 NOTE — PROGRESS NOTES
Transition of Care Plan   RUR: 6%    Disposition: The disposition plan is home with family assistance   F/U with PCP/Specialist     Transport: family     Reason for Admission:  Pancreatitis                      RUR Score:    6%                 Plan for utilizing home health:    Not recommended       PCP: First and Last name:  Monalisa Sharma NP     Name of Practice:    Are you a current patient: Yes/No:    Approximate date of last visit:    Can you participate in a virtual visit with your PCP:                     Current Advanced Directive/Advance Care Plan: Full Code      Healthcare Decision Maker:     Transition of Care Plan:                      CRM spoke with patient, introduced self, explained role, verified demographics, and offered assistance. The patient lives with his mother and father, siblings, and grandparents in a home with 3 steps to access. The patient has knee, ankle, and back braces. Patient is independent in his ADL's/IADL's. The patient uses [facility] for his prescriptions with no barriers indicated at this time. Care Management Interventions  PCP Verified by CM: Yes  Palliative Care Criteria Met (RRAT>21 & CHF Dx)?: No  Mode of Transport at Discharge:  Other (see comment) (family)  Transition of Care Consult (CM Consult): Discharge Planning  MyChart Signup: No  Discharge Durable Medical Equipment: No  Health Maintenance Reviewed: Yes  Physical Therapy Consult: No  Occupational Therapy Consult: No  Speech Therapy Consult: No  Support Systems: Parent(s)  Confirm Follow Up Transport: Family  [employer] Information Provided?: No  Discharge Location  Discharge Placement: Home with family assistance    MANUELA Nava

## 2021-12-15 NOTE — ED TRIAGE NOTES
Pt c/o epigastric pain since last night, +n/v, denies diarrhea, +constipation, last bm yesterday, denies fever, +sweating +chills , denies blood or dark tarry stools , denies urinary symptoms

## 2021-12-15 NOTE — ED PROVIDER NOTES
Please note that this dictation was completed with [email or web address], the computer voice recognition software.  Quite often unanticipated grammatical, syntax, homophones, and other interpretive errors are inadvertently transcribed by the computer software.  Please disregard these errors.  Please excuse any errors that have escaped final proofreading. Patient is a 41-year-old male with history of alcohol abuse, alcoholic pancreatitis, gastritis, presenting to ED for evaluation of upper abdominal pain with onset last night. He is also had associated nausea and vomiting, as well as chills and sweats. He states his pain feels similar to his prior episodes of pancreatitis. He states that he drinks about 15-18 beers per day, last drink was prior to onset of pain. He denies fever, chest pain, shortness of breath, bloody emesis, diarrhea, bloody stool, urinary changes, or any other medical complaints at this time.            Past Medical History:   Diagnosis Date    [facility]ism ([facility] [address]     Dystonia     Elevated liver enzymes 9/15/2017    Gastric ulcer     Leucopenia 5/30/2019    Pancreatitis     alcohol induced       Past Surgical History:   Procedure Laterality Date    HX ORTHOPAEDIC      rib fracture- LEFT and RIGHT         Family History:   Problem Relation Age of Onset    Diabetes Mother     Alcohol abuse Mother     Diabetes Maternal Grandfather     Hypertension Father     Alcohol abuse Father     Alcohol abuse Brother        Social History     Socioeconomic History    Marital status: SINGLE     Spouse name: Not on file    Number of children: Not on file    Years of education: Not on file    Highest education level: Not on file   Occupational History    Not on file   Tobacco Use    Smoking status: Current Some Day Smoker     Packs/day: 1.00    Smokeless tobacco: Current User     Types: Snuff    Tobacco comment: uses chews    Substance and Sexual Activity    Alcohol use: Not Currently     Alcohol/week: 84.0 standard drinks     Types: 84 Cans of beer per week     Comment: 3 months sober 8/1/19    Drug use: No    Sexual activity: Yes     Partners: Female   Other Topics Concern    Not on file   Social History Narrative    Not on file     Social Determinants of Health     Financial Resource Strain:     Difficulty of Paying Living Expenses: Not on file   Food Insecurity:     Worried About Running Out of Food in the Last Year: Not on file    Ramirez of Food in the Last Year: Not on file   Transportation Needs:     Lack of Transportation (Medical): Not on file    Lack of Transportation (Non-Medical): Not on file   Physical Activity:     Days of Exercise per Week: Not on file    Minutes of Exercise per Session: Not on file   Stress:     Feeling of Stress : Not on file   Social Connections:     Frequency of Communication with Friends and Family: Not on file    Frequency of Social Gatherings with Friends and Family: Not on file    Attends Yazdanism Services: Not on file    Active Member of [address] [place] [address] or Organizations: Not on file    Attends Club or Organization Meetings: Not on file    Marital Status: Not on file   Intimate Partner Violence:     Fear of Current or Ex-Partner: Not on file    Emotionally Abused: Not on file    Physically Abused: Not on file    Sexually Abused: Not on file   Housing Stability:     Unable to Pay for Housing in the Last Year: Not on file    Number of [place] in the Last Year: Not on file    Unstable Housing in the Last Year: Not on file         ALLERGIES: Pcn [penicillins]    Review of Systems   Constitutional: Positive for chills. Negative for fever. HENT: Negative for ear pain and sore throat. Eyes: Negative for visual disturbance. Respiratory: Negative for cough and shortness of breath. Cardiovascular: Negative for chest pain. Gastrointestinal: Positive for abdominal pain, nausea and vomiting. Negative for blood in stool and diarrhea.    Genitourinary: Negative for dysuria, flank pain and hematuria. Musculoskeletal: Negative for back pain. Skin: Negative for color change. Neurological: Negative for dizziness and headaches. Psychiatric/Behavioral: Negative for confusion. Vitals:    12/15/21 0908   BP: (!) 156/106   Pulse: (!) 59   Resp: 16   Temp: 96.9 °F (36.1 °C)   SpO2: 97%            Physical Exam  Vitals and nursing note reviewed. Constitutional:       General: He is not in acute distress. Appearance: Normal appearance. He is not ill-appearing. HENT:      Head: Normocephalic and atraumatic. Jaw: There is normal jaw occlusion. Eyes:      General: Vision grossly intact. Extraocular Movements: Extraocular movements intact. Conjunctiva/sclera: Conjunctivae normal.   Neck:      Trachea: Phonation normal.   Cardiovascular:      Rate and Rhythm: Normal rate and regular rhythm. Heart sounds: Normal heart sounds. Pulmonary:      Effort: Pulmonary effort is normal.      Breath sounds: Normal breath sounds and air entry. Abdominal:      Palpations: Abdomen is soft. Tenderness: There is abdominal tenderness in the epigastric area. There is guarding. Negative signs include Bhandari's sign and McBurney's sign. Musculoskeletal:         General: Normal range of motion. Cervical back: Normal range of motion. Skin:     General: Skin is warm and dry. Neurological:      General: No focal deficit present. Mental Status: He is alert and oriented to person, place, and time. Psychiatric:         Attention and Perception: Attention normal.         Mood and Affect: Mood normal.         Speech: Speech normal.          MDM  Number of Diagnoses or Management Options  Alcohol abuse  Alcohol-induced acute pancreatitis without infection or necrosis  Elevated LFTs  Diagnosis management comments: Patient is alert, afebrile, vitals stable.   History of alcohol abuse and alcohol induced pancreatitis, presents with 2 days of pain and n/v. Denies tremors or shaking, but notes he does have a history of DTs, CIWA/atival protocols ordered. Last drink was yesterday, drinks 15-18 beers daily. On exam he has tenderness with guarding in the epigastric region. Labs show lipase greater than 3000, mildly elevated LFTs and bilirubin. CT scan consistent with pancreatitis. Patient is admitted to hospital for further work-up, observation, and management. Hospitalist notified and agrees. Attending ED provider is aware of patient and has had the opportunity to personally evaluate the patient, and agrees with admission and current plan. Patient informed of current management plan. All questions answered at this time. Will continue to monitor patient while in ED. Amount and/or Complexity of Data Reviewed  Decide to obtain previous medical records or to obtain history from someone other than the patient: yes  Discuss the patient with other providers: yes (Discussed patient with ED attending Sharon Colbert MD who agrees with current management plan. )      ED Course as of 12/15/21 1142   Wed Dec 15, 2021   1045 Lipase(!): >3,000 [EP]   0036 METABOLIC PANEL, COMPREHENSIVE(!):    Sodium 135(!)   Glucose 149(!)   Creatinine 0.67(!)   BUN/Creatinine ratio 10(!)   Calcium 10. 2(!)   Bilirubin, total 2.9(!)   (!)   (!)   Protein, total 8.5(!)   Globulin 4.4(!)   A-G Ratio 0.9(!) [EP]   1141 CT ABD PELV W CONT  IMPRESSION  Pancreatic edema and significant peripancreatic inflammatory change and free  fluid compatible with acute pancreatitis. Hepatic steatosis. [EP]      ED Course User Index  [EP] SATURNINO Shahid     Perfect Serve Consult for Admission  11:42 AM    ED Room Number: R36/R36  Patient Name and age:  Cindy Canchola 29 y.o.  male  Working Diagnosis:   1. Alcohol-induced acute pancreatitis without infection or necrosis    2. Elevated LFTs    3.  Alcohol abuse        COVID-19 Suspicion:  no  Sepsis present:  no  Reassessment needed: no  Code Status:  Full Code  Readmission: no  Isolation Requirements:  no  Recommended Level of Care:  tele  Department:[facility] Adult ED - 21   Other: 67-year-old male with alcohol induced pancreatitis, lipase greater than 3000. History of alcohol abuse, drinks 15-18 beers per day, last drink was yesterday, does have a history of DTs, denies any tremors currently.      Procedures

## 2021-12-15 NOTE — ROUTINE PROCESS
TRANSFER - OUT REPORT:    Verbal report given to Sharan Fátima (name) on Noé Edmond  being transferred to [facility] (unit) for routine progression of care       Report consisted of patients Situation, Background, Assessment and   Recommendations(SBAR). Information from the following report(s) SBAR, Kardex, ED Summary, Intake/Output, MAR and Recent Results was reviewed with the receiving nurse. Lines:   Peripheral IV 12/15/21 Left Antecubital (Active)   Site Assessment Clean, dry, & intact 12/15/21 0928   Phlebitis Assessment 0 12/15/21 0928   Infiltration Assessment 0 12/15/21 0928   Dressing Status Clean, dry, & intact 12/15/21 0928   Hub Color/Line Status Pink 12/15/21 0928   Action Taken Blood drawn 12/15/21 7763        Opportunity for questions and clarification was provided.       Patient transported with:   [employer]

## 2021-12-15 NOTE — H&P
History & Physical    Primary Care Provider: Rox Avila NP  Source of Information: Patient     History of Presenting Illness:   Johanna Edwards is a 29 y.o. male who presents with abd pain    Patient with history of alcohol abuse, alcoholic pancreatitis, who presents to the hospital with abdominal pain nausea, patient reports the pain started last night, situated in the epigastric region, had some chills and sweats associated with the same, patient reports he drinks about 15-18 beers per day, his last alcoholic beverage was midnight yesterday, patient came to the ER, was found to have pancreatitis and was requested to be admitted to the hospital service. The patient denies any Headache, blurry vision, sore throat, trouble swallowing, trouble with speech, chest pain, SOB, cough, fever, chills, urinary symptoms, constipation, recent travels, sick contacts, focal or generalized neurological symptoms,  falls, injuries, rashes, contact with COVID-19 diagnosed patients, hematemesis, melena, hemoptysis, hematuria, rashes, denies starting any new medications and denies any other concerns or problems besides as mentioned above. Review of Systems:  A comprehensive review of systems was negative except for that written in the History of Present Illness. All other systems reviewed, pertinent positives and negatives noted in HPI    Past Medical History:   Diagnosis Date    Alcoholism ([facility] [address]     Dystonia     Elevated liver enzymes 9/15/2017    Gastric ulcer     Leucopenia 5/30/2019    Pancreatitis     alcohol induced      Past Surgical History:   Procedure Laterality Date    HX ORTHOPAEDIC      rib fracture- LEFT and RIGHT     Prior to Admission medications    Medication Sig Start Date End Date Taking?  Authorizing Provider   Dexilant 60 mg CpDB capsule (delayed release) TAKE ONE CAPSULE DAILY 12/8/21   Charles Rangel NP   levocetirizine (Xyzal) 5 mg tablet Take by mouth daily. Provider, Historical   betamethasone dipropionate (DIPROSONE) 0.05 % topical cream Apply  to affected area two (2) times a day. 11/8/21   Iggy Rangel NP     Allergies   Allergen Reactions    Pcn [Penicillins] Hives      Family History   Problem Relation Age of Onset    Diabetes Mother     Alcohol abuse Mother     Diabetes Maternal Grandfather     Hypertension Father     Alcohol abuse Father     Alcohol abuse Brother       Family history was discussed with the patient, all pertinent and relevant details are mentioned as above, no other pertinent and relevant family history was noted on my discussion with the patient.   Patient specifically denies any history of Gaucher disease in the family  SOCIAL HISTORY:  Patient resides:  Independently x   Assisted Living    SNF    With family care       Smoking history:   None    Former    Chronic x     Alcohol history:   None    Social x   Chronic      Ambulates:   Independently x   w/cane    w/walker    w/wc    CODE STATUS:  DNR    Full x   Other      Objective:     Physical Exam:     Visit Vitals  BP (!) 159/95 (BP 1 Location: Right upper arm, BP Patient Position: At rest)   Pulse (!) 59   Temp 97.9 °F (36.6 °C)   Resp 16   SpO2 100%      O2 Device: None    General : alert x 3, awake, no acute distress, resting in bed, pleasant male, appears to be stated age  [de-identified]: PEERL, moist mucus membrane, TM clear  Neck: supple,   Chest: Clear to auscultation bilaterally   CVS: S1 S2 heard, Capillary refill less than 2 seconds  Abd: soft/tender to palpation in the epigastric region/no rebound/no guarding  Ext: no clubbing, no cyanosis, no edema, brisk 2+ DP pulses  Neuro/Psych: pleasant mood and affect, no focal neurological deficit  Skin: warm           Data Review:     Recent Days:  Recent Labs     12/15/21  0928   WBC 10.1   HGB 16.3   HCT 48.0        Recent Labs     12/15/21  1213 12/15/21  0928   NA  --  135*   K  --  4.6   CL  --  100   CO2 --  29   GLU  --  149*   BUN  --  7   CREA  --  0.67*   CA  --  10.2*   MG  --  1.8   ALB  --  4.1   ALT  --  105*   INR 1.0  --      No results for input(s): PH, PCO2, PO2, HCO3, FIO2 in the last 72 hours. 24 Hour Results:  Recent Results (from the past 24 hour(s))   CBC WITH AUTOMATED DIFF    Collection Time: 12/15/21  9:28 AM   Result Value Ref Range    WBC 10.1 4.1 - 11.1 K/uL    RBC 5.10 4. 10 - 5.70 M/uL    HGB 16.3 12.1 - 17.0 g/dL    HCT 48.0 36.6 - 50.3 %    MCV 94.1 80.0 - 99.0 FL    MCH 32.0 26.0 - 34.0 PG    MCHC 34.0 30.0 - 36.5 g/dL    RDW 13.1 11.5 - 14.5 %    PLATELET 662 768 - 309 K/uL    MPV 9.7 8.9 - 12.9 FL    NRBC 0.0 0  WBC    ABSOLUTE NRBC 0.00 0.00 - 0.01 K/uL    NEUTROPHILS 83 (H) 32 - 75 %    LYMPHOCYTES 7 (L) 12 - 49 %    MONOCYTES 10 5 - 13 %    EOSINOPHILS 0 0 - 7 %    BASOPHILS 0 0 - 1 %    IMMATURE GRANULOCYTES 0 0.0 - 0.5 %    ABS. NEUTROPHILS 8.4 (H) 1.8 - 8.0 K/UL    ABS. LYMPHOCYTES 0.7 (L) 0.8 - 3.5 K/UL    ABS. MONOCYTES 1.0 0.0 - 1.0 K/UL    ABS. EOSINOPHILS 0.0 0.0 - 0.4 K/UL    ABS. BASOPHILS 0.0 0.0 - 0.1 K/UL    ABS. IMM. GRANS. 0.0 0.00 - 0.04 K/UL    DF SMEAR SCANNED      RBC COMMENTS NORMOCYTIC, NORMOCHROMIC     METABOLIC PANEL, COMPREHENSIVE    Collection Time: 12/15/21  9:28 AM   Result Value Ref Range    Sodium 135 (L) 136 - 145 mmol/L    Potassium 4.6 3.5 - 5.1 mmol/L    Chloride 100 97 - 108 mmol/L    CO2 29 21 - 32 mmol/L    Anion gap 6 5 - 15 mmol/L    Glucose 149 (H) 65 - 100 mg/dL    BUN 7 6 - 20 MG/DL    Creatinine 0.67 (L) 0.70 - 1.30 MG/DL    BUN/Creatinine ratio 10 (L) 12 - 20      GFR est AA >60 >60 ml/min/1.73m2    GFR est non-AA >60 >60 ml/min/1.73m2    Calcium 10.2 (H) 8.5 - 10.1 MG/DL    Bilirubin, total 2.9 (H) 0.2 - 1.0 MG/DL    ALT (SGPT) 105 (H) 12 - 78 U/L    AST (SGOT) 117 (H) 15 - 37 U/L    Alk.  phosphatase 92 45 - 117 U/L    Protein, total 8.5 (H) 6.4 - 8.2 g/dL    Albumin 4.1 3.5 - 5.0 g/dL    Globulin 4.4 (H) 2.0 - 4.0 g/dL    A-G Ratio 0.9 (L) 1.1 - 2.2     LIPASE    Collection Time: 12/15/21  9:28 AM   Result Value Ref Range    Lipase >3,000 (H) 73 - 393 U/L   SAMPLES BEING HELD    Collection Time: 12/15/21  9:28 AM   Result Value Ref Range    SAMPLES BEING HELD 1RED     COMMENT        Add-on orders for these samples will be processed based on acceptable specimen integrity and analyte stability, which may vary by analyte. ETHYL ALCOHOL    Collection Time: 12/15/21  9:28 AM   Result Value Ref Range    ALCOHOL(ETHYL),SERUM <10 <10 MG/DL   MAGNESIUM    Collection Time: 12/15/21  9:28 AM   Result Value Ref Range    Magnesium 1.8 1.6 - 2.4 mg/dL   PROTHROMBIN TIME + INR    Collection Time: 12/15/21 12:13 PM   Result Value Ref Range    INR 1.0 0.9 - 1.1      Prothrombin time 10.4 9.0 - 11.1 sec         Imaging:   CT ABD PELV W CONT    Result Date: 12/15/2021  Pancreatic edema and significant peripancreatic inflammatory change and free fluid compatible with acute pancreatitis. Hepatic steatosis. Assessment/Plan     Acute pancreatitis  Alcohol abuse  Alcoholic hepatitis    Patient will be admitted on a telemetry bed, n.p.o., IV Dilaudid, aggressive IV hydration, pain control supportive care antiemetics and close monitoring, obtain fasting lipid profile and reassess as needed  CIWA protocol, seizure precautions, Ativan as needed, banana bag  Monitor LFTs, right upper quadrant ultrasound, hepatology consult, monitor and reassess as needed    GI DVT prophylaxis: Patient will be on heparin             Please note that this dictation was completed with Within3, the computer voice recognition software. Quite often unanticipated grammatical, syntax, homophones, and other interpretive errors are inadvertently transcribed by the computer software. Please disregard these errors. Please excuse any errors that have escaped final proofreading.          Signed By: Naz Moreland MD     December 15, 2021

## 2021-12-16 LAB
ALBUMIN SERPL-MCNC: 3.5 G/DL (ref 3.5–5)
ALBUMIN/GLOB SERPL: 1 {RATIO} (ref 1.1–2.2)
ALP SERPL-CCNC: 64 U/L (ref 45–117)
ALT SERPL-CCNC: 73 U/L (ref 12–78)
ANION GAP SERPL CALC-SCNC: 7 MMOL/L (ref 5–15)
AST SERPL-CCNC: 65 U/L (ref 15–37)
BASOPHILS # BLD: 0 K/UL (ref 0–0.1)
BASOPHILS NFR BLD: 0 % (ref 0–1)
BILIRUB SERPL-MCNC: 1.8 MG/DL (ref 0.2–1)
BUN SERPL-MCNC: 6 MG/DL (ref 6–20)
BUN/CREAT SERPL: 13 (ref 12–20)
CALCIUM SERPL-MCNC: 9.5 MG/DL (ref 8.5–10.1)
CHLORIDE SERPL-SCNC: 102 MMOL/L (ref 97–108)
CO2 SERPL-SCNC: 29 MMOL/L (ref 21–32)
CREAT SERPL-MCNC: 0.47 MG/DL (ref 0.7–1.3)
DIFFERENTIAL METHOD BLD: ABNORMAL
EOSINOPHIL # BLD: 0 K/UL (ref 0–0.4)
EOSINOPHIL NFR BLD: 0 % (ref 0–7)
ERYTHROCYTE [DISTWIDTH] IN BLOOD BY AUTOMATED COUNT: 13.2 % (ref 11.5–14.5)
GLOBULIN SER CALC-MCNC: 3.5 G/DL (ref 2–4)
GLUCOSE BLD STRIP.AUTO-MCNC: 109 MG/DL (ref 65–117)
GLUCOSE SERPL-MCNC: 109 MG/DL (ref 65–100)
HCT VFR BLD AUTO: 43.1 % (ref 36.6–50.3)
HGB BLD-MCNC: 14.6 G/DL (ref 12.1–17)
IMM GRANULOCYTES # BLD AUTO: 0 K/UL (ref 0–0.04)
IMM GRANULOCYTES NFR BLD AUTO: 0 % (ref 0–0.5)
LYMPHOCYTES # BLD: 0.9 K/UL (ref 0.8–3.5)
LYMPHOCYTES NFR BLD: 10 % (ref 12–49)
MCH RBC QN AUTO: 32.1 PG (ref 26–34)
MCHC RBC AUTO-ENTMCNC: 33.9 G/DL (ref 30–36.5)
MCV RBC AUTO: 94.7 FL (ref 80–99)
MONOCYTES # BLD: 1 K/UL (ref 0–1)
MONOCYTES NFR BLD: 12 % (ref 5–13)
NEUTS SEG # BLD: 6.6 K/UL (ref 1.8–8)
NEUTS SEG NFR BLD: 78 % (ref 32–75)
NRBC # BLD: 0 K/UL (ref 0–0.01)
NRBC BLD-RTO: 0 PER 100 WBC
PLATELET # BLD AUTO: 166 K/UL (ref 150–400)
PMV BLD AUTO: 10.1 FL (ref 8.9–12.9)
POTASSIUM SERPL-SCNC: 3.3 MMOL/L (ref 3.5–5.1)
PROT SERPL-MCNC: 7 G/DL (ref 6.4–8.2)
RBC # BLD AUTO: 4.55 M/UL (ref 4.1–5.7)
SERVICE CMNT-IMP: NORMAL
SODIUM SERPL-SCNC: 138 MMOL/L (ref 136–145)
WBC # BLD AUTO: 8.5 K/UL (ref 4.1–11.1)

## 2021-12-16 PROCEDURE — 74011250636 HC RX REV CODE- 250/636: Performed by: EMERGENCY MEDICINE

## 2021-12-16 PROCEDURE — 74011000250 HC RX REV CODE- 250: Performed by: EMERGENCY MEDICINE

## 2021-12-16 PROCEDURE — 74011250636 HC RX REV CODE- 250/636: Performed by: STUDENT IN AN ORGANIZED HEALTH CARE EDUCATION/TRAINING PROGRAM

## 2021-12-16 PROCEDURE — 36415 COLL VENOUS BLD VENIPUNCTURE: CPT

## 2021-12-16 PROCEDURE — 65270000032 HC RM SEMIPRIVATE

## 2021-12-16 PROCEDURE — 80053 COMPREHEN METABOLIC PANEL: CPT

## 2021-12-16 PROCEDURE — 74011250636 HC RX REV CODE- 250/636: Performed by: FAMILY MEDICINE

## 2021-12-16 PROCEDURE — 85025 COMPLETE CBC W/AUTO DIFF WBC: CPT

## 2021-12-16 PROCEDURE — 82962 GLUCOSE BLOOD TEST: CPT

## 2021-12-16 PROCEDURE — 74011250637 HC RX REV CODE- 250/637: Performed by: STUDENT IN AN ORGANIZED HEALTH CARE EDUCATION/TRAINING PROGRAM

## 2021-12-16 RX ORDER — POTASSIUM CHLORIDE 750 MG/1
40 TABLET, FILM COATED, EXTENDED RELEASE ORAL
Status: COMPLETED | OUTPATIENT
Start: 2021-12-16 | End: 2021-12-16

## 2021-12-16 RX ORDER — THERA TABS 400 MCG
1 TAB ORAL DAILY
Status: DISCONTINUED | OUTPATIENT
Start: 2021-12-16 | End: 2021-12-17 | Stop reason: HOSPADM

## 2021-12-16 RX ORDER — MORPHINE SULFATE 2 MG/ML
2 INJECTION, SOLUTION INTRAMUSCULAR; INTRAVENOUS
Status: DISCONTINUED | OUTPATIENT
Start: 2021-12-16 | End: 2021-12-17 | Stop reason: HOSPADM

## 2021-12-16 RX ADMIN — SODIUM CHLORIDE, POTASSIUM CHLORIDE, SODIUM LACTATE AND CALCIUM CHLORIDE 150 ML/HR: 600; 310; 30; 20 INJECTION, SOLUTION INTRAVENOUS at 05:25

## 2021-12-16 RX ADMIN — HYDROMORPHONE HYDROCHLORIDE 1 MG: 1 INJECTION, SOLUTION INTRAMUSCULAR; INTRAVENOUS; SUBCUTANEOUS at 00:55

## 2021-12-16 RX ADMIN — Medication 10 ML: at 22:00

## 2021-12-16 RX ADMIN — HYDROMORPHONE HYDROCHLORIDE 1 MG: 1 INJECTION, SOLUTION INTRAMUSCULAR; INTRAVENOUS; SUBCUTANEOUS at 12:31

## 2021-12-16 RX ADMIN — FOLIC ACID: 5 INJECTION, SOLUTION INTRAMUSCULAR; INTRAVENOUS; SUBCUTANEOUS at 09:27

## 2021-12-16 RX ADMIN — MORPHINE SULFATE 2 MG: 2 INJECTION, SOLUTION INTRAMUSCULAR; INTRAVENOUS at 16:57

## 2021-12-16 RX ADMIN — LORAZEPAM 2 MG: 2 INJECTION INTRAMUSCULAR; INTRAVENOUS at 16:57

## 2021-12-16 RX ADMIN — HEPARIN SODIUM 5000 UNITS: 5000 INJECTION INTRAVENOUS; SUBCUTANEOUS at 16:13

## 2021-12-16 RX ADMIN — SODIUM CHLORIDE, POTASSIUM CHLORIDE, SODIUM LACTATE AND CALCIUM CHLORIDE 150 ML/HR: 600; 310; 30; 20 INJECTION, SOLUTION INTRAVENOUS at 21:08

## 2021-12-16 RX ADMIN — HEPARIN SODIUM 5000 UNITS: 5000 INJECTION INTRAVENOUS; SUBCUTANEOUS at 05:36

## 2021-12-16 RX ADMIN — HEPARIN SODIUM 5000 UNITS: 5000 INJECTION INTRAVENOUS; SUBCUTANEOUS at 21:08

## 2021-12-16 RX ADMIN — MORPHINE SULFATE 2 MG: 2 INJECTION, SOLUTION INTRAMUSCULAR; INTRAVENOUS at 21:08

## 2021-12-16 RX ADMIN — POTASSIUM CHLORIDE 40 MEQ: 750 TABLET, FILM COATED, EXTENDED RELEASE ORAL at 09:30

## 2021-12-16 RX ADMIN — LORAZEPAM 2 MG: 2 INJECTION INTRAMUSCULAR; INTRAVENOUS at 12:31

## 2021-12-16 RX ADMIN — THERA TABS 1 TABLET: TAB at 09:30

## 2021-12-16 RX ADMIN — LORAZEPAM 4 MG: 2 INJECTION INTRAMUSCULAR; INTRAVENOUS at 00:55

## 2021-12-16 NOTE — PROGRESS NOTES
[facility] Adult  Hospitalist Group                                                                                          Hospitalist Progress Note  Yisel Malone MD  Answering service: [phone] from in house phone        Date of Service:  2021  NAME:  Navin Fong  :  1985  MRN:  [id number]      Admission Summary:   HPI : Lisset Guerra is a 29 y.o. male who presents with abd pain     Patient with history of alcohol abuse, alcoholic pancreatitis, who presents to the hospital with abdominal pain nausea, patient reports the pain started last night, situated in the epigastric region, had some chills and sweats associated with the same, patient reports he drinks about 15-18 beers per day, his last alcoholic beverage was midnight yesterday, patient came to the ER, was found to have pancreatitis and was requested to be admitted to the hospital service. \"         Interval history / Subjective:   Patient seen examined at bedside, tolerating clear liquids denies any abdominal pain. Assessment & Plan:     Acute pancreatitis  -Advance diet today, patient tolerating clears  -Continue with as needed pain medication, IV fluids  -Right upper quadrant ultrasound showed no biliary ductal dilatation or gallstones    Alcohol abuse  -Told to abstain    Hypokalemia  -Replete as needed    Alcoholic hepatitis  -Right upper quadrant reviewed as above continue monitor LFTs    Code status: Full  DVT prophylaxis: Heparin subcu    Care Plan discussed with: Patient/Family and Nurse, ZOË  Anticipated Disposition: Home w/Family  Anticipated Discharge: 24 hours to 48 hours     Hospital Problems  Date Reviewed: 2021          Codes Class Noted POA    Pancreatitis ICD-10-CM: K85.90  ICD-9-CM: 663.8  12/15/2021 Unknown                Review of Systems:   A comprehensive review of systems was negative except for that written in the HPI.        Vital Signs:    Last 24hrs VS reviewed since prior progress note. Most recent are:  Visit Vitals  BP (!) 146/75   Pulse 77   Temp 99.2 °F (37.3 °C)   Resp 18   SpO2 94%         Intake/Output Summary (Last 24 hours) at 12/16/2021 1419  Last data filed at 12/16/2021 0540  Gross per 24 hour   Intake --   Output 150 ml   Net -150 ml        Physical Examination:     I had a face to face encounter with this patient and independently examined them on 12/16/2021 as outlined below:          Constitutional:  No acute distress, cooperative, pleasant    ENT:  Oral mucosa moist, oropharynx benign. Resp:  CTA bilaterally. No wheezing/rhonchi/rales. No accessory muscle use   CV:  Regular rhythm, normal rate, no murmurs, gallops, rubs    GI:  Soft, non distended, non tender. normoactive bowel sounds, no hepatosplenomegaly     Musculoskeletal:  No edema, warm, 2+ pulses throughout    Neurologic:  Moves all extremities. AAOx3, CN II-XII reviewed            Data Review:    Review and/or order of clinical lab test  Review and/or order of tests in the radiology section of CPT  Review and/or order of tests in the medicine section of CPT      Labs:     Recent Labs     12/16/21  0532 12/15/21  0928   WBC 8.5 10.1   HGB 14.6 16.3   HCT 43.1 48.0    189     Recent Labs     12/16/21  0532 12/15/21  0928    135*   K 3.3* 4.6    100   CO2 29 29   BUN 6 7   CREA 0.47* 0.67*   * 149*   CA 9.5 10.2*   MG  --  1.8   PHOS  --  3.8     Recent Labs     12/16/21  0532 12/15/21  0928   ALT 73 105*   AP 64 92   TBILI 1.8* 2.9*   TP 7.0 8.5*   ALB 3.5 4.1   GLOB 3.5 4.4*   LPSE  --  >3,000*     Recent Labs     12/15/21  1213   INR 1.0   PTP 10.4      No results for input(s): FE, TIBC, PSAT, FERR in the last 72 hours. Lab Results   Component Value Date/Time    Folate 5.3 04/19/2018 09:50 AM      No results for input(s): PH, PCO2, PO2 in the last 72 hours. No results for input(s): CPK, CKNDX, TROIQ in the last 72 hours.     No lab exists for component: CPKMB  Lab Results Component Value Date/Time    Cholesterol, total 256 (H) 12/15/2021 09:28 AM    HDL Cholesterol 136 12/15/2021 09:28 AM    LDL, calculated 104.8 (H) 12/15/2021 09:28 AM    Triglyceride 76 12/15/2021 09:28 AM    CHOL/HDL Ratio 1.9 12/15/2021 09:28 AM     Lab Results   Component Value Date/Time    Glucose (POC) 109 12/16/2021 05:57 AM    Glucose (POC) 100 08/22/2017 12:19 AM     Lab Results   Component Value Date/Time    Color YELLOW/STRAW 09/14/2017 11:44 AM    Appearance CLEAR 09/14/2017 11:44 AM    Specific gravity 1.014 09/14/2017 11:44 AM    pH (UA) 5.5 09/14/2017 11:44 AM    Protein NEGATIVE  09/14/2017 11:44 AM    Glucose NEGATIVE  09/14/2017 11:44 AM    Ketone 15 (A) 09/14/2017 11:44 AM    Bilirubin NEGATIVE  09/14/2017 11:44 AM    Urobilinogen 1.0 09/14/2017 11:44 AM    Nitrites NEGATIVE  09/14/2017 11:44 AM    Leukocyte Esterase NEGATIVE  09/14/2017 11:44 AM    Epithelial cells FEW 09/14/2017 11:44 AM    Bacteria NEGATIVE  09/14/2017 11:44 AM    WBC 0-4 09/14/2017 11:44 AM    RBC 0-5 09/14/2017 11:44 AM         Medications Reviewed:     Current Facility-Administered Medications   Medication Dose Route Frequency    therapeutic multivitamin (THERAGRAN) tablet 1 Tablet  1 Tablet Oral DAILY    LORazepam (ATIVAN) injection 2 mg  2 mg IntraVENous Q1H PRN    LORazepam (ATIVAN) injection 4 mg  4 mg IntraVENous Q1H PRN    0.9% sodium chloride 5,820 mL with folic acid 1 mg, thiamine 100 mg infusion   IntraVENous DAILY    sodium chloride (NS) flush 5-40 mL  5-40 mL IntraVENous Q8H    sodium chloride (NS) flush 5-40 mL  5-40 mL IntraVENous PRN    lactated Ringers infusion  150 mL/hr IntraVENous CONTINUOUS    HYDROmorphone (DILAUDID) injection 1 mg  1 mg IntraVENous Q4H PRN    heparin (porcine) injection 5,000 Units  5,000 Units SubCUTAneous Q8H    ondansetron (ZOFRAN) injection 4 mg  4 mg IntraVENous Q4H PRN    LORazepam (ATIVAN) tablet 4 mg  4 mg Oral Q4H PRN ______________________________________________________________________  EXPECTED LENGTH OF STAY: 2d 9h  ACTUAL LENGTH OF STAY:          1                 Mak Kang, MD

## 2021-12-16 NOTE — ROUTINE PROCESS
Bedside shift change report given to [facility] (oncoming nurse) by Yoandy (offgoing nurse).  Report included the following information SBAR, Kardex, ED Summary, OR Summary, Procedure Summary, Intake/Output, MAR and Recent Results.

## 2021-12-17 VITALS
RESPIRATION RATE: 16 BRPM | DIASTOLIC BLOOD PRESSURE: 99 MMHG | SYSTOLIC BLOOD PRESSURE: 151 MMHG | TEMPERATURE: 98.4 F | OXYGEN SATURATION: 95 % | HEART RATE: 95 BPM

## 2021-12-17 LAB
ANION GAP SERPL CALC-SCNC: 9 MMOL/L (ref 5–15)
BUN SERPL-MCNC: 4 MG/DL (ref 6–20)
BUN/CREAT SERPL: 9 (ref 12–20)
CALCIUM SERPL-MCNC: 9.4 MG/DL (ref 8.5–10.1)
CHLORIDE SERPL-SCNC: 101 MMOL/L (ref 97–108)
CO2 SERPL-SCNC: 25 MMOL/L (ref 21–32)
CREAT SERPL-MCNC: 0.43 MG/DL (ref 0.7–1.3)
GLUCOSE SERPL-MCNC: 95 MG/DL (ref 65–100)
POTASSIUM SERPL-SCNC: 3.2 MMOL/L (ref 3.5–5.1)
SODIUM SERPL-SCNC: 135 MMOL/L (ref 136–145)

## 2021-12-17 PROCEDURE — 74011250636 HC RX REV CODE- 250/636: Performed by: STUDENT IN AN ORGANIZED HEALTH CARE EDUCATION/TRAINING PROGRAM

## 2021-12-17 PROCEDURE — 74011250637 HC RX REV CODE- 250/637: Performed by: STUDENT IN AN ORGANIZED HEALTH CARE EDUCATION/TRAINING PROGRAM

## 2021-12-17 PROCEDURE — 74011250636 HC RX REV CODE- 250/636: Performed by: NURSE PRACTITIONER

## 2021-12-17 PROCEDURE — 36415 COLL VENOUS BLD VENIPUNCTURE: CPT

## 2021-12-17 PROCEDURE — 80048 BASIC METABOLIC PNL TOTAL CA: CPT

## 2021-12-17 PROCEDURE — 74011250636 HC RX REV CODE- 250/636: Performed by: FAMILY MEDICINE

## 2021-12-17 RX ORDER — POTASSIUM CHLORIDE 750 MG/1
20 TABLET, FILM COATED, EXTENDED RELEASE ORAL
Status: DISCONTINUED | OUTPATIENT
Start: 2021-12-17 | End: 2021-12-17 | Stop reason: HOSPADM

## 2021-12-17 RX ORDER — HYDRALAZINE HYDROCHLORIDE 20 MG/ML
10 INJECTION INTRAMUSCULAR; INTRAVENOUS ONCE
Status: COMPLETED | OUTPATIENT
Start: 2021-12-17 | End: 2021-12-17

## 2021-12-17 RX ADMIN — HEPARIN SODIUM 5000 UNITS: 5000 INJECTION INTRAVENOUS; SUBCUTANEOUS at 04:48

## 2021-12-17 RX ADMIN — Medication 10 ML: at 06:00

## 2021-12-17 RX ADMIN — THERA TABS 1 TABLET: TAB at 08:37

## 2021-12-17 RX ADMIN — SODIUM CHLORIDE, POTASSIUM CHLORIDE, SODIUM LACTATE AND CALCIUM CHLORIDE 150 ML/HR: 600; 310; 30; 20 INJECTION, SOLUTION INTRAVENOUS at 08:37

## 2021-12-17 RX ADMIN — MORPHINE SULFATE 2 MG: 2 INJECTION, SOLUTION INTRAMUSCULAR; INTRAVENOUS at 02:07

## 2021-12-17 RX ADMIN — POTASSIUM CHLORIDE 20 MEQ: 750 TABLET, FILM COATED, EXTENDED RELEASE ORAL at 08:37

## 2021-12-17 RX ADMIN — HYDRALAZINE HYDROCHLORIDE 10 MG: 20 INJECTION INTRAMUSCULAR; INTRAVENOUS at 02:02

## 2021-12-17 NOTE — PROGRESS NOTES
Bedside and Verbal shift change report given to Dea Gómez RN  (oncoming nurse) by Kai Lozano (offgoing nurse). Report included the following information SBAR and Kardex.

## 2021-12-17 NOTE — DISCHARGE SUMMARY
Discharge Summary       PATIENT ID: Carmine Dandy  MRN: [id number]   YOB: 1985    DATE OF ADMISSION: 12/15/2021  9:17 AM    DATE OF DISCHARGE: 12/17/21   PRIMARY CARE PROVIDER: Laquita Hudson NP     ATTENDING PHYSICIAN: Hollis Pierson MD  DISCHARGING PROVIDER: Hollis Pierson MD    To contact this individual call [phone] and ask the  to page. If unavailable ask to be transferred the Adult Hospitalist Department. CONSULTATIONS: None    PROCEDURES/SURGERIES: * No surgery found *    ADMITTING DIAGNOSES & HOSPITAL COURSE:   HPI : \"Yovany Cruz is a 29 y. o. male who presents with abd pain     Patient with history of alcohol abuse, alcoholic pancreatitis, who presents to the hospital with abdominal pain nausea, patient reports the pain started last night, situated in the epigastric region, had some chills and sweats associated with the same, patient reports he drinks about 15-18 beers per day, his last alcoholic beverage was midnight yesterday, patient came to the ER, was found to have pancreatitis and was requested to be admitted to the hospital service. \"    Patient was managed for acute alcoholic pancreatitis, placed on CIWA protocol did not require Ativan in the past 24 hours. Patient was continued on IV fluids LR with alleviation of abdominal pain. Diet was advanced as tolerated for which he tolerated soft foods. Told patient at bedside he is to abstain from alcohol use for which he indicated understanding and agreement. No significant abdominal pain this a.m. Patient also underwent right upper quadrant ultrasound to evaluate for alcoholic hepatitis which showed no evidence of ductal dilatation or gallstones. Instructed patient to follow-up with PCP, GI outpatient. DC home today.         DISCHARGE DIAGNOSES / PLAN:      Acute pancreatitis  -Advance diet yesterday tolerated soft foods  -Continue with as needed pain medication, IV fluids  -Right upper quadrant ultrasound showed no biliary ductal dilatation or gallstones     Alcohol abuse  -Told to abstain     Hypokalemia  -Replete as needed     Alcoholic hepatitis  -Right upper quadrant reviewed as above continue monitor LFTs  -GI f/u      Code status: Full  DVT prophylaxis: Heparin subcu     Care Plan discussed with: Patient/Family and NurseZOË  Anticipated Disposition: Home w/Family  Anticipated Discharge: 24 hours to 48 hours         FOLLOW UP APPOINTMENTS:    Follow-up Information     Follow up With Specialties Details Why Contact Info    Serafin Haynes NP Family Medicine In 3 days  [address]  [phone]      Baltazar Monteiro MD Gastroenterology In 1 week  [place] [address]  [phone]             ADDITIONAL CARE RECOMMENDATIONS: Repeat CBC, BMP 3 to 5 days    DIET: Low fat, Low cholesterol    ACTIVITY: Activity as tolerated          DISCHARGE MEDICATIONS:  Discharge Medication List as of 12/17/2021 10:31 AM      CONTINUE these medications which have NOT CHANGED    Details   Dexilant 60 mg CpDB capsule (delayed release) TAKE ONE CAPSULE DAILY, Normal, Disp-30 Capsule, R-5      levocetirizine (Xyzal) 5 mg tablet Take  by mouth daily. , Historical Med      betamethasone dipropionate (DIPROSONE) 0.05 % topical cream Apply  to affected area two (2) times a day., Normal, Disp-45 g, R-0               NOTIFY YOUR PHYSICIAN FOR ANY OF THE FOLLOWING:   Fever over 101 degrees for 24 hours. Chest pain, shortness of breath, fever, chills, nausea, vomiting, diarrhea, change in mentation, falling, weakness, bleeding. Severe pain or pain not relieved by medications. Or, any other signs or symptoms that you may have questions about.     DISPOSITION:    Home With:   OT  PT  HH  RN       Long term SNF/Inpatient Rehab   x Independent/assisted living    Hospice    Other:       PATIENT CONDITION AT DISCHARGE:     Functional status    Poor     Deconditioned    x Independent Cognition    x Lucid     Forgetful     Dementia        Code status   x  Full code     DNR      PHYSICAL EXAMINATION AT DISCHARGE:    I had a face to face encounter with this patient and independently examined them on 12/17/2021 as outlined below:                                                   Constitutional:  No acute distress, cooperative, pleasant    ENT:  Oral mucosa moist, oropharynx benign. Resp:  CTA bilaterally. No wheezing/rhonchi/rales. No accessory muscle use   CV:  Regular rhythm, normal rate, no murmurs, gallops, rubs    GI:  Soft, non distended, non tender. normoactive bowel sounds, no hepatosplenomegaly     Musculoskeletal:  No edema, warm, 2+ pulses throughout    Neurologic:  Moves all extremities. AAOx3, CN II-XII reviewed           CHRONIC MEDICAL DIAGNOSES:  Problem List as of 12/17/2021 Date Reviewed: 11/8/2021          Codes Class Noted - Resolved    Pancreatitis ICD-10-CM: K85.90  ICD-9-CM: 886.3  12/15/2021 - Present        Non-seasonal allergic rhinitis ICD-10-CM: J30.89  ICD-9-CM: 477.8  11/8/2021 - Present        Insomnia ICD-10-CM: G47.00  ICD-9-CM: 780.52  5/30/2019 - Present        Anxiety and depression ICD-10-CM: F41.9, F32. A  ICD-9-CM: 300.00, 311  5/30/2019 - Present        Alcoholism ([facility] [address] ICD-10-CM: F10.20  ICD-9-CM: 303.90  8/16/2018 - Present        Chronic alcoholic gastritis without hemorrhage ICD-10-CM: K29.20  ICD-9-CM: 535.30  4/19/2018 - Present        RESOLVED: Leucopenia ICD-10-CM: D72.819  ICD-9-CM: 288.50  5/30/2019 - 8/1/2019        RESOLVED: Alcohol-induced acute pancreatitis ICD-10-CM: K85.20  ICD-9-CM: 356.3  9/15/2017 - 4/19/2018        RESOLVED: Elevated liver enzymes ICD-10-CM: R74.8  ICD-9-CM: 790.5  9/15/2017 - 8/1/2019        RESOLVED: Acute pancreatitis ICD-10-CM: K85.90  ICD-9-CM: 693.4  9/15/2017 - 10/5/2017        RESOLVED: Pancreatitis ICD-10-CM: K85.90  ICD-9-CM: 157.1  9/15/2017 - 4/19/2018        RESOLVED: Abdominal pain ICD-10-CM: R10.9  ICD-9-CM: 789.00  9/13/2017 - 10/5/2017        RESOLVED: Alcohol use disorder ICD-10-CM: DZO6422  ICD-9-CM: V49.89  3/20/2017 - 8/16/2018        RESOLVED: Gastritis ICD-10-CM: K29.70  ICD-9-CM: 535.50  2/29/2016 - 4/19/2018        RESOLVED: Alcohol abuse ICD-10-CM: F10.10  ICD-9-CM: 305.00  2/29/2016 - 3/20/2017              Greater than 30 minutes were spent with the patient on counseling and coordination of care    Signed:   Anjum Adair MD  12/17/2021  11:18 AM

## 2021-12-17 NOTE — DISCHARGE INSTRUCTIONS
Haley Griggs MD   Physician   Internal Medicine   Discharge Summary       Signed   Date of Service:  12/17/21 1056                       []Roman copied text    []Mitchell for details       Discharge Summary         PATIENT ID: Grace Dahl  MRN: [id number]   YOB: 1985    DATE OF ADMISSION: 12/15/2021  9:17 AM    DATE OF DISCHARGE: 12/17/21   PRIMARY CARE PROVIDER: Perla Kelly NP      ATTENDING PHYSICIAN: Linda Romo MD  DISCHARGING PROVIDER: Linda Romo MD    To contact this individual call [phone] and ask the  to page. If unavailable ask to be transferred the Adult Hospitalist Department.     CONSULTATIONS: None     PROCEDURES/SURGERIES: * No surgery found *     ADMITTING DIAGNOSES & HOSPITAL COURSE:   HPI : \"Yovany Cruz is a 29 y. o. male who presents with abd pain     Patient with history of alcohol abuse, alcoholic pancreatitis, who presents to the hospital with abdominal pain nausea, patient reports the pain started last night, situated in the epigastric region, had some chills and sweats associated with the same, patient reports he drinks about 15-18 beers per day, his last alcoholic beverage was midnight yesterday, patient came to the ER, was found to have pancreatitis and was requested to be admitted to the hospital service. \"     Patient was managed for acute alcoholic pancreatitis, placed on CIWA protocol did not require Ativan in the past 24 hours. Patient was continued on IV fluids LR with alleviation of abdominal pain. Diet was advanced as tolerated for which he tolerated soft foods. Told patient at bedside he is to abstain from alcohol use for which he indicated understanding and agreement. No significant abdominal pain this a.m. Patient also underwent right upper quadrant ultrasound to evaluate for alcoholic hepatitis which showed no evidence of ductal dilatation or gallstones. Instructed patient to follow-up with PCP, GI outpatient.  [facility] today.           DISCHARGE DIAGNOSES / PLAN:       Acute pancreatitis  -Advance diet yesterday tolerated soft foods  -Continue with as needed pain medication, IV fluids  -Right upper quadrant ultrasound showed no biliary ductal dilatation or gallstones     Alcohol abuse  -Told to abstain     Hypokalemia  -Replete as needed     Alcoholic hepatitis  -Right upper quadrant reviewed as above continue monitor LFTs  -GI f/u      Code status: Full  DVT prophylaxis: Heparin subcu     Care Plan discussed with: Patient/Family and NurseZOË  Anticipated Disposition: Home w/Family  Anticipated Discharge: 24 hours to 48 hours            FOLLOW UP APPOINTMENTS:             Follow-up Information      Follow up With Specialties Details Why Contact Info     Rosa Baron NP Family Medicine In 3 days   [address] [facility] [address] [phone]        Peggy Escalera MD Gastroenterology In 1 week   [address] [id number]  [phone]                ADDITIONAL CARE RECOMMENDATIONS: Repeat CBC, BMP 3 to 5 days     DIET: Low fat, Low cholesterol     ACTIVITY: Activity as tolerated              DISCHARGE MEDICATIONS:      Discharge Medication List as of 12/17/2021 10:31 AM           CONTINUE these medications which have NOT CHANGED     Details   Dexilant 60 mg CpDB capsule (delayed release) TAKE ONE CAPSULE DAILY, Normal, Disp-30 Capsule, R-5       levocetirizine (Xyzal) 5 mg tablet Take  by mouth daily. , Historical Med       betamethasone dipropionate (DIPROSONE) 0.05 % topical cream Apply  to affected area two (2) times a day., Normal, Disp-45 g, R-0                    NOTIFY YOUR PHYSICIAN FOR ANY OF THE FOLLOWING:   Fever over 101 degrees for 24 hours. Chest pain, shortness of breath, fever, chills, nausea, vomiting, diarrhea, change in mentation, falling, weakness, bleeding. Severe pain or pain not relieved by medications.   Or, any other signs or symptoms that you may have questions about.     DISPOSITION:    Home With:    OT   PT   HH   RN        Long term SNF/Inpatient Rehab   x Independent/assisted living     Hospice     Other:         PATIENT CONDITION AT DISCHARGE:      Functional status     Poor      Deconditioned    x Independent       Cognition    x Lucid      Forgetful      Dementia          Code status   x  Full code      DNR       PHYSICAL EXAMINATION AT DISCHARGE:     I had a face to face encounter with this patient and independently examined them on 12/17/2021 as outlined below:                                                   Constitutional:  No acute distress, cooperative, pleasant    ENT:  Oral mucosa moist, oropharynx benign. Resp:  CTA bilaterally. No wheezing/rhonchi/rales. No accessory muscle use   CV:  Regular rhythm, normal rate, no murmurs, gallops, rubs    GI:  Soft, non distended, non tender. normoactive bowel sounds, no hepatosplenomegaly     Musculoskeletal:  No edema, warm, 2+ pulses throughout    Neurologic:  Moves all extremities.  AAOx3, CN II-XII reviewed               CHRONIC MEDICAL DIAGNOSES:             Problem List as of 12/17/2021 Date Reviewed: 11/8/2021           Codes Class Noted - Resolved     Pancreatitis ICD-10-CM: K85.90  ICD-9-CM: 400.4   12/15/2021 - Present           Non-seasonal allergic rhinitis ICD-10-CM: J30.89  ICD-9-CM: 477.8   11/8/2021 - Present           Insomnia ICD-10-CM: G47.00  ICD-9-CM: 780.52   5/30/2019 - Present           Anxiety and depression ICD-10-CM: F41.9, F32. A  ICD-9-CM: 300.00, 311   5/30/2019 - Present           Alcoholism ([facility] ICD-10-CM: F10.20  ICD-9-CM: 303.90   8/16/2018 - Present           Chronic alcoholic gastritis without hemorrhage ICD-10-CM: K29.20  ICD-9-CM: 535.30   4/19/2018 - Present           RESOLVED: Leucopenia ICD-10-CM: Y75.596  ICD-9-CM: 288.50   5/30/2019 - 8/1/2019           RESOLVED: Alcohol-induced acute pancreatitis ICD-10-CM: K85.20  ICD-9-CM: 626.7   9/15/2017 - 4/19/2018         RESOLVED: Elevated liver enzymes ICD-10-CM: R74.8  ICD-9-CM: 790.5   9/15/2017 - 8/1/2019           RESOLVED: Acute pancreatitis ICD-10-CM: K85.90  ICD-9-CM: 577.0   9/15/2017 - 10/5/2017           RESOLVED: Pancreatitis ICD-10-CM: K85.90  ICD-9-CM: 577.0   9/15/2017 - 4/19/2018           RESOLVED: Abdominal pain ICD-10-CM: R10.9  ICD-9-CM: 789.00   9/13/2017 - 10/5/2017           RESOLVED: Alcohol use disorder ICD-10-CM: AWZ5145  ICD-9-CM: V49.89   3/20/2017 - 8/16/2018           RESOLVED: Gastritis ICD-10-CM: K29.70  ICD-9-CM: 535.50   2/29/2016 - 4/19/2018           RESOLVED: Alcohol abuse ICD-10-CM: F10.10  ICD-9-CM: 305.00   2/29/2016 - 3/20/2017                   Greater than 30 minutes were spent with the patient on counseling and coordination of care     Signed:   Akbar Vivas MD  12/17/2021  11:18 AM                 Routing History

## 2021-12-22 ENCOUNTER — OFFICE VISIT (OUTPATIENT)
Dept: FAMILY MEDICINE CLINIC | Age: 36
End: 2021-12-22
Payer: COMMERCIAL

## 2021-12-22 VITALS
OXYGEN SATURATION: 97 % | WEIGHT: 158.6 LBS | HEART RATE: 78 BPM | HEIGHT: 73 IN | TEMPERATURE: 98.6 F | RESPIRATION RATE: 16 BRPM | DIASTOLIC BLOOD PRESSURE: 90 MMHG | BODY MASS INDEX: 21.02 KG/M2 | SYSTOLIC BLOOD PRESSURE: 144 MMHG

## 2021-12-22 DIAGNOSIS — F10.20 ALCOHOLISM (HCC): Primary | ICD-10-CM

## 2021-12-22 DIAGNOSIS — K29.20 CHRONIC ALCOHOLIC GASTRITIS WITHOUT HEMORRHAGE: ICD-10-CM

## 2021-12-22 DIAGNOSIS — Z09 HOSPITAL DISCHARGE FOLLOW-UP: ICD-10-CM

## 2021-12-22 DIAGNOSIS — K85.20 ALCOHOL-INDUCED ACUTE PANCREATITIS WITHOUT INFECTION OR NECROSIS: ICD-10-CM

## 2021-12-22 LAB
ALBUMIN SERPL-MCNC: 4 G/DL (ref 3.5–5)
ALBUMIN/GLOB SERPL: 1 {RATIO} (ref 1.1–2.2)
ALP SERPL-CCNC: 84 U/L (ref 45–117)
ALT SERPL-CCNC: 66 U/L (ref 12–78)
ANION GAP SERPL CALC-SCNC: 6 MMOL/L (ref 5–15)
AST SERPL-CCNC: 40 U/L (ref 15–37)
BILIRUB SERPL-MCNC: 0.9 MG/DL (ref 0.2–1)
BUN SERPL-MCNC: 4 MG/DL (ref 6–20)
BUN/CREAT SERPL: 6 (ref 12–20)
CALCIUM SERPL-MCNC: 9.5 MG/DL (ref 8.5–10.1)
CHLORIDE SERPL-SCNC: 104 MMOL/L (ref 97–108)
CO2 SERPL-SCNC: 26 MMOL/L (ref 21–32)
CREAT SERPL-MCNC: 0.66 MG/DL (ref 0.7–1.3)
FOLATE SERPL-MCNC: 12.2 NG/ML (ref 5–21)
GLOBULIN SER CALC-MCNC: 4 G/DL (ref 2–4)
GLUCOSE SERPL-MCNC: 107 MG/DL (ref 65–100)
POTASSIUM SERPL-SCNC: 4 MMOL/L (ref 3.5–5.1)
PROT SERPL-MCNC: 8 G/DL (ref 6.4–8.2)
SODIUM SERPL-SCNC: 136 MMOL/L (ref 136–145)
VIT B12 SERPL-MCNC: 855 PG/ML (ref 193–986)

## 2021-12-22 PROCEDURE — 99215 OFFICE O/P EST HI 40 MIN: CPT | Performed by: NURSE PRACTITIONER

## 2021-12-22 PROCEDURE — 1111F DSCHRG MED/CURRENT MED MERGE: CPT | Performed by: NURSE PRACTITIONER

## 2021-12-22 NOTE — PATIENT INSTRUCTIONS
[facility] [address] [place]  [phone]  [address] [facility], [address] [place] [address] [address]  [address]

## 2021-12-22 NOTE — PROGRESS NOTES
Chief Complaint   Patient presents with   [facility] Follow Up       1. \"Have you been to the ER, urgent care clinic since your last visit? Hospitalized since your last visit? \" Yes When: 12/15/2021, [facility]    2. \"Have you seen or consulted any other health care providers outside of the [address] [place] [address] since your last visit? \" Yes When: 12/2021 Where: [facility] Reason for visit: finger     3. For patients over 45: Has the patient had a colonoscopy?  No       3 most recent PHQ Screens 12/22/2021   Little interest or pleasure in doing things Not at all   Feeling down, depressed, irritable, or hopeless Not at all   Total Score PHQ 2 0     Health Maintenance Due   Topic Date Due    COVID-19 Vaccine (1) Never done    Flu Vaccine (1) Never done

## 2021-12-22 NOTE — PROGRESS NOTES
Assessment/Plan:     Diagnoses and all orders for this visit:    1. Alcoholism ([facility]  -     METABOLIC PANEL, COMPREHENSIVE; Future  -     VITAMIN B12 & FOLATE; Future  -     VITAMIN B1, WHOLE BLOOD; Future  He was referred for outpatient rehabilitation. Motivation appears low. Labs pending. 2. Chronic alcoholic gastritis without hemorrhage  -     REFERRAL TO GASTROENTEROLOGY    3. Alcohol-induced acute pancreatitis without infection or necrosis  Resolved at this time. We have discussed the importance of abstaining from alcohol. Follow-up and Dispositions    · Return in about 3 months (around 3/22/2022) for Complete Physical.         Discussed expected course/resolution/complications of diagnosis in detail with patient. Medication risks/benefits/costs/interactions/alternatives discussed with patient. Pt was given after visit summary which includes diagnoses, current medications & vitals. Pt expressed understanding with the diagnosis and plan          Subjective:      Alejandra Ventura is a 29 y.o. male who presents for had concerns including Hospital Follow Up. Hospital Follow Up  Alejandra Ventura is seen for follow up from recent admission to [facility] on 12/15/2021. We reviewed the lab results, imaging, the notes, the records. He presented with acute abdominal pain. He is taking his Dexilant as directed & without any side effects. He reports symptoms are improved. Medication Reconciliation reviewed today. He reports back pain today which is a new onset. He has not scheduled with gastroenterology. He reports numerous outpatient rehabilitation programs and treatments in the past which were not successful. He has abstained from alcohol since his last hospital stay. He is accompanied by his mother today.      Patient Active Problem List   Diagnosis Code    Chronic alcoholic gastritis without hemorrhage K29.20    Alcoholism ([facility] F10.20    Insomnia G47.00    Anxiety and depression F41.9, F32. A    Non-seasonal allergic rhinitis J30.89    Pancreatitis K85.90       Current Outpatient Medications   Medication Sig Dispense Refill    Dexilant 60 mg CpDB capsule (delayed release) TAKE ONE CAPSULE DAILY 30 Capsule 5    levocetirizine (Xyzal) 5 mg tablet Take  by mouth daily.  betamethasone dipropionate (DIPROSONE) 0.05 % topical cream Apply  to affected area two (2) times a day. 45 g 0       Allergies   Allergen Reactions    Pcn [Penicillins] Hives       ROS:   Review of Systems   Constitutional: Negative for malaise/fatigue. Eyes: Negative for blurred vision. Respiratory: Negative for shortness of breath. Cardiovascular: Negative for chest pain. Musculoskeletal: Positive for back pain. Objective:     Visit Vitals  BP (!) 144/90   Pulse 78   Temp 98.6 °F (37 °C) (Temporal)   Resp 16   Ht 6' 1\" (1.854 m)   Wt 158 lb 9.6 oz (71.9 kg)   SpO2 97%   BMI 20.92 kg/m²       Vitals and Nurse Documentation reviewed. Physical Exam  Constitutional:       General: He is not in acute distress. Cardiovascular:      Heart sounds: S1 normal and S2 normal. No murmur heard. No friction rub. No gallop. Pulmonary:      Effort: No respiratory distress. Breath sounds: Normal breath sounds. Abdominal:      General: Abdomen is flat. There is no distension. Tenderness: There is no abdominal tenderness. There is no guarding or rebound. Skin:     General: Skin is warm and dry. Psychiatric:         Mood and Affect: Mood and affect normal.         Behavior: Behavior is hyperactive. Judgment: Judgment is impulsive.

## 2021-12-27 LAB — VIT B1 BLD-SCNC: 136 NMOL/L (ref 66.5–200)

## 2022-02-17 ENCOUNTER — OFFICE VISIT (OUTPATIENT)
Dept: FAMILY MEDICINE CLINIC | Age: 37
End: 2022-02-17
Payer: COMMERCIAL

## 2022-02-17 VITALS
DIASTOLIC BLOOD PRESSURE: 71 MMHG | BODY MASS INDEX: 20.83 KG/M2 | HEART RATE: 75 BPM | WEIGHT: 157.2 LBS | TEMPERATURE: 97.8 F | SYSTOLIC BLOOD PRESSURE: 114 MMHG | OXYGEN SATURATION: 96 % | HEIGHT: 73 IN | RESPIRATION RATE: 16 BRPM

## 2022-02-17 DIAGNOSIS — K29.20 CHRONIC ALCOHOLIC GASTRITIS WITHOUT HEMORRHAGE: ICD-10-CM

## 2022-02-17 DIAGNOSIS — Z00.00 ROUTINE GENERAL MEDICAL EXAMINATION AT A HEALTH CARE FACILITY: Primary | ICD-10-CM

## 2022-02-17 DIAGNOSIS — F10.20 ALCOHOLISM (HCC): ICD-10-CM

## 2022-02-17 PROBLEM — K85.90 PANCREATITIS: Status: RESOLVED | Noted: 2021-12-15 | Resolved: 2022-02-17

## 2022-02-17 LAB
ALBUMIN SERPL-MCNC: 3.8 G/DL (ref 3.5–5)
ALBUMIN/GLOB SERPL: 1 {RATIO} (ref 1.1–2.2)
ALP SERPL-CCNC: 83 U/L (ref 45–117)
ALT SERPL-CCNC: 44 U/L (ref 12–78)
ANION GAP SERPL CALC-SCNC: 8 MMOL/L (ref 5–15)
AST SERPL-CCNC: 39 U/L (ref 15–37)
BILIRUB SERPL-MCNC: 0.6 MG/DL (ref 0.2–1)
BUN SERPL-MCNC: 5 MG/DL (ref 6–20)
BUN/CREAT SERPL: 9 (ref 12–20)
CALCIUM SERPL-MCNC: 9.4 MG/DL (ref 8.5–10.1)
CHLORIDE SERPL-SCNC: 105 MMOL/L (ref 97–108)
CHOLEST SERPL-MCNC: 212 MG/DL
CO2 SERPL-SCNC: 25 MMOL/L (ref 21–32)
CREAT SERPL-MCNC: 0.58 MG/DL (ref 0.7–1.3)
ERYTHROCYTE [DISTWIDTH] IN BLOOD BY AUTOMATED COUNT: 12.3 % (ref 11.5–14.5)
GLOBULIN SER CALC-MCNC: 3.8 G/DL (ref 2–4)
GLUCOSE SERPL-MCNC: 111 MG/DL (ref 65–100)
HCT VFR BLD AUTO: 45.5 % (ref 36.6–50.3)
HDLC SERPL-MCNC: 71 MG/DL
HDLC SERPL: 3 {RATIO} (ref 0–5)
HGB BLD-MCNC: 15.2 G/DL (ref 12.1–17)
LDLC SERPL CALC-MCNC: 87.4 MG/DL (ref 0–100)
MCH RBC QN AUTO: 31.9 PG (ref 26–34)
MCHC RBC AUTO-ENTMCNC: 33.4 G/DL (ref 30–36.5)
MCV RBC AUTO: 95.4 FL (ref 80–99)
NRBC # BLD: 0 K/UL (ref 0–0.01)
NRBC BLD-RTO: 0 PER 100 WBC
PLATELET # BLD AUTO: 298 K/UL (ref 150–400)
PMV BLD AUTO: 9.6 FL (ref 8.9–12.9)
POTASSIUM SERPL-SCNC: 4.2 MMOL/L (ref 3.5–5.1)
PROT SERPL-MCNC: 7.6 G/DL (ref 6.4–8.2)
RBC # BLD AUTO: 4.77 M/UL (ref 4.1–5.7)
SODIUM SERPL-SCNC: 138 MMOL/L (ref 136–145)
TRIGL SERPL-MCNC: 268 MG/DL (ref ?–150)
VLDLC SERPL CALC-MCNC: 53.6 MG/DL
WBC # BLD AUTO: 4.2 K/UL (ref 4.1–11.1)

## 2022-02-17 PROCEDURE — 99395 PREV VISIT EST AGE 18-39: CPT | Performed by: NURSE PRACTITIONER

## 2022-02-17 NOTE — PROGRESS NOTES
HISTORY OF PRESENT ILLNESS  Amaya Mills is a 29 y.o. male. HPI  CPE. History of alcoholism. Drinks up to 1 case of beer daily. Reports he is not ready to stop drinking at this time. Has been in rehab x 2. History of alcoholic gastritis. Taking dexilant daily. Patient Active Problem List   Diagnosis Code    Chronic alcoholic gastritis without hemorrhage K29.20    Alcoholism ([facility] F10.20    Insomnia G47.00    Anxiety and depression F41.9, F32. A    Non-seasonal allergic rhinitis J30.89     Current Outpatient Medications   Medication Sig    Dexilant 60 mg CpDB capsule (delayed release) TAKE ONE CAPSULE DAILY    levocetirizine (Xyzal) 5 mg tablet Take  by mouth daily.  betamethasone dipropionate (DIPROSONE) 0.05 % topical cream Apply  to affected area two (2) times a day. No current facility-administered medications for this visit. Social History     Tobacco Use    Smoking status: Current Some Day Smoker     Packs/day: 1.00    Smokeless tobacco: Current User     Types: Snuff    Tobacco comment: uses chews    Substance Use Topics    Alcohol use: Not Currently     Alcohol/week: 84.0 standard drinks     Types: 84 Cans of beer per week     Comment: 3 months sober 8/1/19    Drug use: No     Blood pressure 114/71, pulse 75, temperature 97.8 °F (36.6 °C), temperature source Temporal, resp. rate 16, height 6' 1\" (1.854 m), weight 157 lb 3.2 oz (71.3 kg), SpO2 96 %. Review of Systems   Respiratory: Negative for cough and shortness of breath. Cardiovascular: Negative for chest pain, palpitations and leg swelling. Gastrointestinal: Positive for abdominal pain (intermittent) and heartburn (stable). Negative for blood in stool and melena. Neurological: Negative for dizziness and headaches. Psychiatric/Behavioral: Positive for substance abuse. Negative for hallucinations. All other systems reviewed and are negative.       Physical Exam  Constitutional:       General: He is not in acute distress. HENT:      Right Ear: Tympanic membrane and ear canal normal.      Left Ear: Tympanic membrane and ear canal normal.   Eyes:      Extraocular Movements: Extraocular movements intact. Pupils: Pupils are equal, round, and reactive to light. Cardiovascular:      Rate and Rhythm: Normal rate and regular rhythm. Heart sounds: Normal heart sounds. Pulmonary:      Effort: Pulmonary effort is normal.      Breath sounds: Normal breath sounds. Abdominal:      General: There is no distension. Palpations: Abdomen is soft. There is no mass. Tenderness: There is no abdominal tenderness. There is no guarding. Musculoskeletal:      Cervical back: Neck supple. Right lower leg: No edema. Left lower leg: No edema. Lymphadenopathy:      Cervical: No cervical adenopathy. Skin:     General: Skin is warm and dry. Neurological:      Mental Status: He is alert. Coordination: Coordination normal.   Psychiatric:         Mood and Affect: Mood normal.         Behavior: Behavior normal.         ASSESSMENT and PLAN  Diagnoses and all orders for this visit:    1. Routine general medical examination at a health care facility  -     CBC W/O DIFF; Future  -     LIPID PANEL; Future  -     METABOLIC PANEL, COMPREHENSIVE; Future  Age appropriate health maintenance and prevention reviewed. Follow healthy diet and exercise regularly at least 4-5x weekly. Fasting labs sent. 2. Alcoholism ([address]  Discussed cessation and resources available. 3. Chronic alcoholic gastritis without hemorrhage  Moderately stable on dexilant. Follow up 1 year. We discussed the expected course, resolution and complications of the diagnosis in detail. Medication risks, benefits, costs, interactions and side effects were discussed. The patient was advised to contact the office for worsening of condition or FTI as anticipated. The patient expressed understanding of the diagnosis and plan.

## 2022-02-17 NOTE — PROGRESS NOTES
Chief Complaint   Patient presents with    Complete Physical       1. Have you been to the ER, urgent care clinic since your last visit? Hospitalized since your last visit? No    2. Have you seen or consulted any other health care providers outside of the [address] [place] [address] since your last visit? Include any pap smears or colon screening. Yes When: 12/21 Where: [facility] Reason for visit: boxer fracture and broken pinky    3. For patients over 45: Has the patient had a colonoscopy? NA - based on age       1 most recent PHQ Screens 2/17/2022   Little interest or pleasure in doing things Not at all   Feeling down, depressed, irritable, or hopeless Not at all   Total Score PHQ 2 0       Abuse Screening Questionnaire 2/17/2022   Do you ever feel afraid of your partner? N   Are you in a relationship with someone who physically or mentally threatens you? N   Is it safe for you to go home?  Shelli Olea

## 2022-03-18 PROBLEM — J30.89 NON-SEASONAL ALLERGIC RHINITIS: Status: ACTIVE | Noted: 2021-11-08

## 2022-03-18 PROBLEM — G47.00 INSOMNIA: Status: ACTIVE | Noted: 2019-05-30

## 2022-03-19 PROBLEM — F32.A ANXIETY AND DEPRESSION: Status: ACTIVE | Noted: 2019-05-30

## 2022-03-19 PROBLEM — F10.20 ALCOHOLISM (HCC): Status: ACTIVE | Noted: 2018-08-16

## 2022-03-19 PROBLEM — K29.20 CHRONIC ALCOHOLIC GASTRITIS WITHOUT HEMORRHAGE: Status: ACTIVE | Noted: 2018-04-19

## 2022-03-19 PROBLEM — F41.9 ANXIETY AND DEPRESSION: Status: ACTIVE | Noted: 2019-05-30

## 2023-05-15 DIAGNOSIS — K29.20 ALCOHOLIC GASTRITIS WITHOUT BLEEDING: ICD-10-CM

## 2023-05-16 RX ORDER — DEXLANSOPRAZOLE 60 MG/1
CAPSULE, DELAYED RELEASE ORAL
Qty: 30 CAPSULE | Refills: 0 | OUTPATIENT
Start: 2023-05-16

## 2023-08-21 ENCOUNTER — NURSE TRIAGE (OUTPATIENT)
Dept: OTHER | Facility: CLINIC | Age: 38
End: 2023-08-21

## 2023-08-21 NOTE — TELEPHONE ENCOUNTER
Received call from [facility], caller not on line. Complaint: Swollen lymph nodes, lump at back of throat    Practice Name: [facility]    Caller's telephone number verified as [phone]    Unsuccessful attempt to re-connect with caller via phone, unable to leave message for callback        Attention Provider: Thank you for allowing me to participate in the care of your patient. The patient was connected to triage in response to information provided to the ECC/PSC. Please do not respond through this encounter as the response is not directed to a shared pool. Reason for Disposition   Second attempt to contact caller AND no contact made. Phone number verified.     Protocols used: No Contact or Duplicate Contact Call-ADULT-[place]

## 2023-08-21 NOTE — TELEPHONE ENCOUNTER
Location of patient: [place]    Received call from [facility] with [facility]. Subjective: Caller states \"feels like has sore swollen throat  right side \"     Current Symptoms: swollen right side of throat and possible swelling to outside also painful to swallow no dif breathing , no fevers  no redness seen no ear pain hx gerd and seizures     Also needs med refill     Onset: 4 days     Associated Symptoms: NA    Pain Severity: 8/10    Temperature: none       What has been tried: advil    LMP: NA Pregnant: NA    Recommended disposition: See in Office Today    Care advice provided, patient verbalizes understanding; denies any other questions or concerns; instructed to call back for any new or worsening symptoms. Patient/Caller agrees with recommended disposition; writer provided warm transfer to [facility] for appointment scheduling    Attention Provider: Thank you for allowing me to participate in the care of your patient. The patient was connected to triage in response to information provided to the ECC/PSC. Please do not respond through this encounter as the response is not directed to a shared pool.          Reason for Disposition   Patient wants to be seen    Protocols used: Sore Throat-ADULT-[place]

## 2023-08-21 NOTE — TELEPHONE ENCOUNTER
Received call from Carrie Clifton at [facility], caller not on line. Complaint: \"Swelling in right side of throat\"     Practice Name: [facility]    Caller's telephone number verified as [phone]    Unsuccessful attempt to re-connect with caller via phone, unable to leave message for callback, mailbox is full. Attempts x 2. Attention Provider: Thank you for allowing me to participate in the care of your patient. The patient was connected to triage in response to information provided to the ECC/PSC. Please do not respond through this encounter as the response is not directed to a shared pool. Reason for Disposition   Second attempt to contact caller AND no contact made. Phone number verified.     Protocols used: No Contact or Duplicate Contact Call-ADULT-[place]

## 2023-08-31 ENCOUNTER — OFFICE VISIT (OUTPATIENT)
Age: 38
End: 2023-08-31
Payer: COMMERCIAL

## 2023-08-31 VITALS
DIASTOLIC BLOOD PRESSURE: 82 MMHG | HEART RATE: 85 BPM | SYSTOLIC BLOOD PRESSURE: 126 MMHG | RESPIRATION RATE: 16 BRPM | HEIGHT: 73 IN | TEMPERATURE: 98.3 F | OXYGEN SATURATION: 97 % | BODY MASS INDEX: 20.54 KG/M2 | WEIGHT: 155 LBS

## 2023-08-31 DIAGNOSIS — J30.89 NON-SEASONAL ALLERGIC RHINITIS, UNSPECIFIED TRIGGER: ICD-10-CM

## 2023-08-31 DIAGNOSIS — K29.20 CHRONIC ALCOHOLIC GASTRITIS WITHOUT HEMORRHAGE: ICD-10-CM

## 2023-08-31 DIAGNOSIS — J02.0 ACUTE STREPTOCOCCAL PHARYNGITIS: Primary | ICD-10-CM

## 2023-08-31 DIAGNOSIS — J02.9 SORE THROAT: ICD-10-CM

## 2023-08-31 DIAGNOSIS — L30.9 DERMATITIS: ICD-10-CM

## 2023-08-31 DIAGNOSIS — F10.20 ALCOHOLISM (HCC): ICD-10-CM

## 2023-08-31 PROBLEM — G47.00 INSOMNIA: Status: RESOLVED | Noted: 2019-05-30 | Resolved: 2023-08-31

## 2023-08-31 PROBLEM — F32.A ANXIETY AND DEPRESSION: Status: RESOLVED | Noted: 2019-05-30 | Resolved: 2023-08-31

## 2023-08-31 PROBLEM — F41.9 ANXIETY AND DEPRESSION: Status: RESOLVED | Noted: 2019-05-30 | Resolved: 2023-08-31

## 2023-08-31 LAB
GROUP A STREP ANTIGEN, POC: POSITIVE
VALID INTERNAL CONTROL, POC: ABNORMAL

## 2023-08-31 PROCEDURE — 87880 STREP A ASSAY W/OPTIC: CPT | Performed by: NURSE PRACTITIONER

## 2023-08-31 PROCEDURE — 99214 OFFICE O/P EST MOD 30 MIN: CPT | Performed by: NURSE PRACTITIONER

## 2023-08-31 RX ORDER — PANTOPRAZOLE SODIUM 40 MG/1
40 TABLET, DELAYED RELEASE ORAL
Qty: 90 TABLET | Refills: 1 | Status: SHIPPED | OUTPATIENT
Start: 2023-08-31

## 2023-08-31 RX ORDER — CEPHALEXIN 500 MG/1
500 CAPSULE ORAL 2 TIMES DAILY
Qty: 20 CAPSULE | Refills: 0 | Status: SHIPPED | OUTPATIENT
Start: 2023-08-31 | End: 2023-09-10

## 2023-08-31 SDOH — ECONOMIC STABILITY: HOUSING INSECURITY
IN THE LAST 12 MONTHS, WAS THERE A TIME WHEN YOU DID NOT HAVE A STEADY PLACE TO SLEEP OR SLEPT IN A SHELTER (INCLUDING NOW)?: NO

## 2023-08-31 SDOH — ECONOMIC STABILITY: FOOD INSECURITY: WITHIN THE PAST 12 MONTHS, YOU WORRIED THAT YOUR FOOD WOULD RUN OUT BEFORE YOU GOT MONEY TO BUY MORE.: NEVER TRUE

## 2023-08-31 SDOH — ECONOMIC STABILITY: INCOME INSECURITY: HOW HARD IS IT FOR YOU TO PAY FOR THE VERY BASICS LIKE FOOD, HOUSING, MEDICAL CARE, AND HEATING?: NOT HARD AT ALL

## 2023-08-31 SDOH — ECONOMIC STABILITY: FOOD INSECURITY: WITHIN THE PAST 12 MONTHS, THE FOOD YOU BOUGHT JUST DIDN'T LAST AND YOU DIDN'T HAVE MONEY TO GET MORE.: NEVER TRUE

## 2023-08-31 ASSESSMENT — ENCOUNTER SYMPTOMS
SINUS PAIN: 0
SHORTNESS OF BREATH: 0
COUGH: 0
ABDOMINAL PAIN: 0
SORE THROAT: 1
CHEST TIGHTNESS: 0
BLOOD IN STOOL: 0

## 2023-08-31 ASSESSMENT — PATIENT HEALTH QUESTIONNAIRE - PHQ9
SUM OF ALL RESPONSES TO PHQ QUESTIONS 1-9: 0
2. FEELING DOWN, DEPRESSED OR HOPELESS: 0
1. LITTLE INTEREST OR PLEASURE IN DOING THINGS: 0
SUM OF ALL RESPONSES TO PHQ QUESTIONS 1-9: 0
SUM OF ALL RESPONSES TO PHQ QUESTIONS 1-9: 0
SUM OF ALL RESPONSES TO PHQ9 QUESTIONS 1 & 2: 0
SUM OF ALL RESPONSES TO PHQ QUESTIONS 1-9: 0

## 2023-08-31 NOTE — PROGRESS NOTES
Chief Complaint   Patient presents with    Lymphadenopathy     NECK - Neck Swelling     Sore Throat    Foot Problem     Right redness          1. \"Have you been to the ER, urgent care clinic since your last visit? Hospitalized since your last visit? \" no    2. \"Have you seen or consulted any other health care providers outside of the [address] [place] [address] since your last visit? \"  Ortho for hand
Constitutional:  Negative for chills and fever. HENT:  Positive for sore throat. Negative for sinus pain. Respiratory:  Negative for cough, chest tightness and shortness of breath. Cardiovascular:  Negative for chest pain and leg swelling. Gastrointestinal:  Negative for abdominal pain and blood in stool. Epigastric discomfort. Musculoskeletal:  Negative for myalgias. Skin:  Positive for rash. Neurological:  Negative for dizziness and headaches. All other systems reviewed and are negative. Objective:   Physical Exam  Constitutional:       General: He is not in acute distress. HENT:      Right Ear: Tympanic membrane and ear canal normal.      Left Ear: Tympanic membrane and ear canal normal.      Mouth/Throat:      Pharynx: Posterior oropharyngeal erythema present. No oropharyngeal exudate. Cardiovascular:      Rate and Rhythm: Normal rate and regular rhythm. Heart sounds: Normal heart sounds. Pulmonary:      Effort: Pulmonary effort is normal.      Breath sounds: Normal breath sounds. Musculoskeletal:      Cervical back: Neck supple. Right lower leg: No edema. Left lower leg: No edema. Lymphadenopathy:      Cervical: No cervical adenopathy. Skin:     General: Skin is warm and dry. Comments: Erythematous macular rash on proximal, dorsal foot. Neurological:      Mental Status: He is alert. Coordination: Coordination normal.   Psychiatric:         Mood and Affect: Mood normal.       Assessment / Plan:      Keshawn Bryant was seen today for lymphadenopathy, sore throat and foot problem. Diagnoses and all orders for this visit:    Acute streptococcal pharyngitis  -     cephALEXin (KEFLEX) 500 MG capsule; Take 1 capsule by mouth 2 times daily for 10 days  Has PCN allergy, reports tolerance to keflex. Ibuprofen or tylenol prn pain. Sore throat  -     AMB POC RAPID STREP A  Rapid stress positive.     Chronic alcoholic gastritis without hemorrhage  -

## 2024-03-26 DIAGNOSIS — K29.20 CHRONIC ALCOHOLIC GASTRITIS WITHOUT HEMORRHAGE: ICD-10-CM

## 2024-03-26 RX ORDER — PANTOPRAZOLE SODIUM 40 MG/1
40 TABLET, DELAYED RELEASE ORAL
Qty: 90 TABLET | Refills: 0 | Status: SHIPPED | OUTPATIENT
Start: 2024-03-26

## 2024-03-26 NOTE — TELEPHONE ENCOUNTER
Left detailed message on 3/26/24 to inform him that we received a request for his Protonix,and that it has yet to be filled.Pt need's to make an In Office Appt with DARRELL Dela Cruz.

## 2024-03-26 NOTE — TELEPHONE ENCOUNTER
PCP: Valerie Dela Cruz, GILBERT - NP      No future appointments.    Requested Prescriptions     Pending Prescriptions Disp Refills    pantoprazole (PROTONIX) 40 MG tablet [Pharmacy Med Name: pantoprazole 40 mg tablet,delayed release] 90 tablet 1     Sig: TAKE 1 TABLET BY MOUTH EVERY MORNING (before Breakfast)        LOV 08/31/2023

## 2024-10-31 ENCOUNTER — OFFICE VISIT (OUTPATIENT)
Age: 39
End: 2024-10-31

## 2024-10-31 VITALS
BODY MASS INDEX: 19.19 KG/M2 | SYSTOLIC BLOOD PRESSURE: 128 MMHG | HEIGHT: 73 IN | HEART RATE: 93 BPM | WEIGHT: 144.8 LBS | OXYGEN SATURATION: 95 % | RESPIRATION RATE: 16 BRPM | TEMPERATURE: 98.4 F | DIASTOLIC BLOOD PRESSURE: 83 MMHG

## 2024-10-31 DIAGNOSIS — L30.9 DERMATITIS: ICD-10-CM

## 2024-10-31 DIAGNOSIS — K29.20 CHRONIC ALCOHOLIC GASTRITIS WITHOUT HEMORRHAGE: Primary | ICD-10-CM

## 2024-10-31 DIAGNOSIS — Z13.31 POSITIVE DEPRESSION SCREENING: ICD-10-CM

## 2024-10-31 DIAGNOSIS — K21.9 GASTROESOPHAGEAL REFLUX DISEASE WITHOUT ESOPHAGITIS: ICD-10-CM

## 2024-10-31 DIAGNOSIS — F10.20 ALCOHOLISM (HCC): ICD-10-CM

## 2024-10-31 PROCEDURE — 99214 OFFICE O/P EST MOD 30 MIN: CPT | Performed by: NURSE PRACTITIONER

## 2024-10-31 RX ORDER — DEXLANSOPRAZOLE 60 MG/1
60 CAPSULE, DELAYED RELEASE ORAL DAILY
Qty: 30 CAPSULE | Refills: 5 | Status: SHIPPED | OUTPATIENT
Start: 2024-10-31

## 2024-10-31 RX ORDER — ALBUTEROL SULFATE 90 UG/1
INHALANT RESPIRATORY (INHALATION)
COMMUNITY
Start: 2024-10-15

## 2024-10-31 RX ORDER — THIAMINE MONONITRATE (VIT B1) 100 MG
100 TABLET ORAL DAILY
Qty: 30 TABLET | Refills: 5 | Status: SHIPPED | OUTPATIENT
Start: 2024-10-31

## 2024-10-31 RX ORDER — FLUTICASONE PROPIONATE 50 MCG
SPRAY, SUSPENSION (ML) NASAL
COMMUNITY
Start: 2024-10-15

## 2024-10-31 RX ORDER — BUPROPION HYDROCHLORIDE 100 MG/1
100 TABLET, EXTENDED RELEASE ORAL DAILY
Qty: 30 TABLET | Refills: 5 | Status: SHIPPED | OUTPATIENT
Start: 2024-10-31

## 2024-10-31 RX ORDER — BENZONATATE 100 MG/1
CAPSULE ORAL
COMMUNITY
Start: 2024-10-15

## 2024-10-31 SDOH — ECONOMIC STABILITY: INCOME INSECURITY: HOW HARD IS IT FOR YOU TO PAY FOR THE VERY BASICS LIKE FOOD, HOUSING, MEDICAL CARE, AND HEATING?: VERY HARD

## 2024-10-31 SDOH — ECONOMIC STABILITY: FOOD INSECURITY: WITHIN THE PAST 12 MONTHS, YOU WORRIED THAT YOUR FOOD WOULD RUN OUT BEFORE YOU GOT MONEY TO BUY MORE.: OFTEN TRUE

## 2024-10-31 SDOH — ECONOMIC STABILITY: FOOD INSECURITY: WITHIN THE PAST 12 MONTHS, THE FOOD YOU BOUGHT JUST DIDN'T LAST AND YOU DIDN'T HAVE MONEY TO GET MORE.: SOMETIMES TRUE

## 2024-10-31 ASSESSMENT — PATIENT HEALTH QUESTIONNAIRE - PHQ9
SUM OF ALL RESPONSES TO PHQ QUESTIONS 1-9: 12
SUM OF ALL RESPONSES TO PHQ9 QUESTIONS 1 & 2: 2
4. FEELING TIRED OR HAVING LITTLE ENERGY: MORE THAN HALF THE DAYS
6. FEELING BAD ABOUT YOURSELF - OR THAT YOU ARE A FAILURE OR HAVE LET YOURSELF OR YOUR FAMILY DOWN: NEARLY EVERY DAY
9. THOUGHTS THAT YOU WOULD BE BETTER OFF DEAD, OR OF HURTING YOURSELF: NOT AT ALL
SUM OF ALL RESPONSES TO PHQ QUESTIONS 1-9: 12
2. FEELING DOWN, DEPRESSED OR HOPELESS: MORE THAN HALF THE DAYS
10. IF YOU CHECKED OFF ANY PROBLEMS, HOW DIFFICULT HAVE THESE PROBLEMS MADE IT FOR YOU TO DO YOUR WORK, TAKE CARE OF THINGS AT HOME, OR GET ALONG WITH OTHER PEOPLE: VERY DIFFICULT
SUM OF ALL RESPONSES TO PHQ QUESTIONS 1-9: 12
3. TROUBLE FALLING OR STAYING ASLEEP: NEARLY EVERY DAY
1. LITTLE INTEREST OR PLEASURE IN DOING THINGS: NOT AT ALL
SUM OF ALL RESPONSES TO PHQ QUESTIONS 1-9: 12
7. TROUBLE CONCENTRATING ON THINGS, SUCH AS READING THE NEWSPAPER OR WATCHING TELEVISION: SEVERAL DAYS
5. POOR APPETITE OR OVEREATING: SEVERAL DAYS
8. MOVING OR SPEAKING SO SLOWLY THAT OTHER PEOPLE COULD HAVE NOTICED. OR THE OPPOSITE, BEING SO FIGETY OR RESTLESS THAT YOU HAVE BEEN MOVING AROUND A LOT MORE THAN USUAL: NOT AT ALL

## 2024-10-31 ASSESSMENT — ENCOUNTER SYMPTOMS
CHEST TIGHTNESS: 0
SHORTNESS OF BREATH: 0
ABDOMINAL PAIN: 0
BLOOD IN STOOL: 0

## 2024-10-31 NOTE — PATIENT INSTRUCTIONS
[facility] Food Resources*  (Call [facility]/ [facility] if need more resources.)       SNAP (formerly Food [place])  What they offer: SNAP is used like cash to buy eligible food items from authorized retailers.  Apply for benefits online: https://www.commonHealPayp.[place].gov/  Apply for benefits by phone: [phone] (M-F 8AM - 7PM; Sat 9AM - 12PM)  [employer] Hunger Hotline  What they offer:  The [employer]ved Hunger Hotline connects individuals in need of food with a local food pantry or program across [address] [facility] in [facility].   Website: https://Cloudy.fr/store-/ (search zip code)   Hunger Hotline Inquiry Form: https://Cloudy.fr/hunger-hotline/  Hunger Hotline Phone Number:  807-701-2500 x [phone] (M-F 9:00AM-4:00PM)    Meals on Wheels  Meals on Wheels is a program that delivers meals to individuals who have no reliable means for maintaining a healthy diet.  Services are provided by area.     DermApproved Service Area:   [facility], and [place].  [facility], [place], [facility], [place], and [place]  Website:  [email or web address]-help/meals-[email or web address]/  To Apply:  Ages 18-59:  Apply online: [email or web address]-[email or web address]  Apply by phone: [phone]          Meals on Wheels  [facility] Area (continued):  To Apply:  Ages 60+:  Apply Online: [email or web address]  Apply By Phone: [phone] (M-F 8:30AM-5PM)  For [facility], [facility], [facility], [place], [place], [place] and [place]: You may also apply by calling [employer], [facility] Agency on [phone]  For [facility], and [place] as well as [place], [place] and [place]:    Contact [facility] Agency on [phone]    [place] [facility]

## 2024-10-31 NOTE — PROGRESS NOTES
Subjective:      Patient ID: Mikael Corrigan is a 39 y.o. male     HPI  Follow up health problems.  Patient has not been seen in over a year.  Chronic alcoholic gastris/GERD.  Was taking dexilant daily.  Switched to protonix due to cost.  Dexilant more effective.  Would like to switch back.   Alcoholism.  Continues to drink large quantities of alcohol.  Has a lot of personal stress and relationship issues.  Has lost weight, not eating well.  Positive depression screening.  No SI.    Seen at [facility] for rash on buttocks.  Symptoms improved with prescribed cream.  Also has itchy rash on ankles.        Patient Active Problem List   Diagnosis    Non-seasonal allergic rhinitis    Alcoholism (HCC)    Chronic alcoholic gastritis without hemorrhage     Current Outpatient Medications   Medication Sig    albuterol sulfate HFA (PROVENTIL;VENTOLIN;PROAIR) 108 (90 Base) MCG/ACT inhaler     benzonatate (TESSALON) 100 MG capsule     fluticasone (FLONASE) 50 MCG/ACT nasal spray     dexlansoprazole (DEXILANT) 60 MG CPDR delayed release capsule Take 1 capsule by mouth daily    buPROPion (WELLBUTRIN SR) 100 MG extended release tablet Take 1 tablet by mouth daily    vitamin B-1 (THIAMINE) 100 MG tablet Take 1 tablet by mouth daily    pantoprazole (PROTONIX) 40 MG tablet TAKE 1 TABLET BY MOUTH EVERY MORNING (before Breakfast) (Patient not taking: Reported on 10/31/2024)    betamethasone dipropionate 0.05 % cream Apply topically 2 times daily (Patient not taking: Reported on 8/31/2023)     No current facility-administered medications for this visit.     Social History     Tobacco Use    Smoking status: Some Days     Current packs/day: 1.00     Types: Cigarettes    Smokeless tobacco: Current    Tobacco comments:     Quit smoking: uses chews   Substance Use Topics    Alcohol use: Not Currently     Alcohol/week: 84.0 standard drinks of alcohol    Drug use: No     Blood pressure 128/83, pulse 93, temperature 98.4 °F (36.9 °C), resp.

## 2024-10-31 NOTE — PROGRESS NOTES
Chief Complaint   Patient presents with    Medication Refill    Rash     On the bottom and both ankles      \"Have you been to the ER, urgent care clinic since your last visit?  Hospitalized since your last visit?\"    YES - When: approximately 2  weeks ago.  Where and Why: [facility].    “Have you seen or consulted any other health care providers outside of [facility] since your last visit?”    NO

## 2024-11-04 ENCOUNTER — TELEPHONE (OUTPATIENT)
Age: 39
End: 2024-11-04

## 2024-11-05 ENCOUNTER — TELEPHONE (OUTPATIENT)
Age: 39
End: 2024-11-05

## 2024-11-05 NOTE — TELEPHONE ENCOUNTER
[facility] pharmacy called to see if you have received the cover my meds PA for the patient